# Patient Record
Sex: FEMALE | Race: WHITE | NOT HISPANIC OR LATINO | Employment: FULL TIME | URBAN - METROPOLITAN AREA
[De-identification: names, ages, dates, MRNs, and addresses within clinical notes are randomized per-mention and may not be internally consistent; named-entity substitution may affect disease eponyms.]

---

## 2016-09-28 LAB — HIV 1+2 AB+HIV1 P24 AG SERPL QL IA: NON REACTIVE

## 2017-02-18 ENCOUNTER — APPOINTMENT (OUTPATIENT)
Dept: ULTRASOUND IMAGING | Facility: CLINIC | Age: 37
End: 2017-02-18
Attending: OBSTETRICS & GYNECOLOGY
Payer: COMMERCIAL

## 2017-02-18 PROCEDURE — 76815 OB US LIMITED FETUS(S): CPT

## 2017-03-10 RX ORDER — CEFAZOLIN SODIUM 1 G/3ML
1 INJECTION, POWDER, FOR SOLUTION INTRAMUSCULAR; INTRAVENOUS
Status: CANCELLED | OUTPATIENT
Start: 2017-03-10

## 2017-03-10 RX ORDER — CEFAZOLIN SODIUM 2 G/100ML
2 INJECTION, SOLUTION INTRAVENOUS
Status: CANCELLED | OUTPATIENT
Start: 2017-03-10

## 2017-03-10 RX ORDER — SODIUM CHLORIDE, SODIUM LACTATE, POTASSIUM CHLORIDE, CALCIUM CHLORIDE 600; 310; 30; 20 MG/100ML; MG/100ML; MG/100ML; MG/100ML
INJECTION, SOLUTION INTRAVENOUS CONTINUOUS
Status: CANCELLED | OUTPATIENT
Start: 2017-03-10

## 2017-03-14 ENCOUNTER — HOSPITAL ENCOUNTER (OUTPATIENT)
Facility: CLINIC | Age: 37
Discharge: HOME | End: 2017-03-15
Attending: OBSTETRICS & GYNECOLOGY | Admitting: OBSTETRICS & GYNECOLOGY
Payer: COMMERCIAL

## 2017-03-14 VITALS
TEMPERATURE: 97.9 F | RESPIRATION RATE: 18 BRPM | DIASTOLIC BLOOD PRESSURE: 69 MMHG | WEIGHT: 158 LBS | BODY MASS INDEX: 26.33 KG/M2 | SYSTOLIC BLOOD PRESSURE: 118 MMHG | HEIGHT: 65 IN

## 2017-03-14 LAB
ALBUMIN UR-MCNC: NEGATIVE MG/DL
APPEARANCE UR: CLEAR
BILIRUB UR QL STRIP: NEGATIVE
COLOR UR AUTO: NORMAL
GLUCOSE UR STRIP-MCNC: NEGATIVE MG/DL
HGB UR QL STRIP: NEGATIVE
KETONES UR STRIP-MCNC: NEGATIVE MG/DL
LEUKOCYTE ESTERASE UR QL STRIP: NEGATIVE
NITRATE UR QL: NEGATIVE
PH UR STRIP: 6 PH (ref 5–7)
SP GR UR STRIP: 1.01 (ref 1–1.03)
URN SPEC COLLECT METH UR: NORMAL
UROBILINOGEN UR STRIP-MCNC: 0 MG/DL (ref 0–2)

## 2017-03-14 PROCEDURE — 81003 URINALYSIS AUTO W/O SCOPE: CPT | Performed by: OBSTETRICS & GYNECOLOGY

## 2017-03-14 RX ORDER — ONDANSETRON 2 MG/ML
4 INJECTION INTRAMUSCULAR; INTRAVENOUS EVERY 6 HOURS PRN
Status: DISCONTINUED | OUTPATIENT
Start: 2017-03-14 | End: 2017-03-15 | Stop reason: HOSPADM

## 2017-03-14 NOTE — IP AVS SNAPSHOT
[facility] Labor and Delivery    [address]    [facility] [address]    Phone:  [phone]    Fax:  [phone]                                       After Visit Summary   3/14/2017    Wesley Goins    MRN: [id number]           After Visit Summary Signature Page     I have received my discharge instructions, and my questions have been answered. I have discussed any challenges I see with this plan with the nurse or doctor.    ..........................................................................................................................................  Patient/Patient Representative Signature      ..........................................................................................................................................  Patient Representative Print Name and Relationship to Patient    ..................................................               ................................................  Date                                            Time    ..........................................................................................................................................  Reviewed by Signature/Title    ...................................................              ..............................................  Date                                                            Time

## 2017-03-14 NOTE — IP AVS SNAPSHOT
MRN:[id number]                      After Visit Summary   3/14/2017    Wesley Goins    MRN: [id number]           Thank you!     Thank you for choosing [facility] for your care. Our goal is always to provide you with excellent care. Hearing back from our patients is one way we can continue to improve our services. Please take a few minutes to complete the written survey that you may receive in the mail after you visit. If you would like to speak to someone directly about your visit please contact Patient Relations at [phone]. Thank you!          Patient Information     Date Of Birth          1980        About your hospital stay     You were admitted on:  March 14, 2017 You last received care in the:  [facility] Labor and Delivery    You were discharged on:  March 15, 2017       Who to Call     For medical emergencies, please call 911.  For non-urgent questions about your medical care, please call your primary care provider or clinic, [phone]          Attending Provider     Provider Specialty    Jose Lion MD OB/Gyn       Primary Care Provider Office Phone # Fax #    Shantal Ca [phone]       [facility] [address]  [facility] [address]        Your next 10 appointments already scheduled     Apr 19, 2017   Procedure with Shantal Ca MD   [facility] Labor and Delivery (--)    [address]  [facility] [address] [phone]              Further instructions from your care team       Discharge Instruction for Undelivered Patients      You were seen for: Labor Assessment  We Consulted: Dr Lion  You had (Test or Medicine):Fetal Heart Rate, Uterine monitoring and UA.     Diet:   Drink 8 to 12 glasses of liquids (milk, juice, water) every day.  You may eat meals and snacks.     Activity:  Count fetal kicks everyday (see handout)  Call your doctor or nurse midwife if your baby is moving less than usual.  "    Call your provider if you notice:  Swelling in your face or increased swelling in your hands or legs.  Headaches that are not relieved by Tylenol (acetaminophen).  Changes in your vision (blurring: seeing spots or stars.)  Nausea (sick to your stomach) and vomiting (throwing up).   Weight gain of 5 pounds or more per week.  Heartburn that doesn't go away.  Signs of bladder infection: pain when you urinate (use the toilet), need to go more often and more urgently.  The bag of santos (rupture of membranes) breaks, or you notice leaking in your underwear.  Bright red blood in your underwear.  Abdominal (lower belly) or stomach pain.  Second (plus) baby: Contractions (tightening) less than 10 minutes apart and getting stronger.  Increase or change in vaginal discharge (note the color and amount)    Follow-up:  As scheduled in the clinic          Pending Results     No orders found for last 3 day(s).            Admission Information     Date & Time Provider Department Dept. Phone    3/14/2017 Jose Lion MD [facility] Labor and Delivery [phone]      Your Vitals Were     Blood Pressure Temperature Respirations Height Weight Last Period    118/69 97.9  F (36.6  C) 18 1.651 m (5' 5\") 71.7 kg (158 lb) 2016    BMI (Body Mass Index)                   26.29 kg/m2           [employer] Information     [employer] lets you send messages to your doctor, view your test results, renew your prescriptions, schedule appointments and more. To sign up, go to [email or web address] . Click on \"Log in\" on the left side of the screen, which will take you to the Welcome page. Then click on \"Sign up Now\" on the right side of the page.     You will be asked to enter the access code listed below, as well as some personal information. Please follow the directions to create your username and password.     Your access code is: [email or web address]  Expires: 2017  5:20 PM     Your access code will  in 90 days. If you need help " or a new code, please call your [facility] clinic or [phone].        Care EveryWhere ID     This is your Care EveryWhere ID. This could be used by other organizations to access your [facility] medical records  [id number]           Review of your medicines      UNREVIEWED medicines. Ask your doctor about these medicines        Dose / Directions    NIFEDIPINE PO        Dose:  10 mg   Take 10 mg by mouth every 6 hours   Refills:  0       prenatal multivitamin  plus iron 27-0.8 MG Tabs per tablet        Dose:  1 tablet   Take 1 tablet by mouth daily   Refills:  0       VITAMIN D (CHOLECALCIFEROL) PO        Take by mouth daily   Refills:  0                Protect others around you: Learn how to safely use, store and throw away your medicines at www.disposemymeds.org.             Medication List: This is a list of all your medications and when to take them. Check marks below indicate your daily home schedule. Keep this list as a reference.      Medications           Morning Afternoon Evening Bedtime As Needed    NIFEDIPINE PO   Take 10 mg by mouth every 6 hours                                prenatal multivitamin  plus iron 27-0.8 MG Tabs per tablet   Take 1 tablet by mouth daily                                VITAMIN D (CHOLECALCIFEROL) PO   Take by mouth daily

## 2017-03-15 PROCEDURE — 99214 OFFICE O/P EST MOD 30 MIN: CPT | Mod: 25

## 2017-03-15 PROCEDURE — 99214 OFFICE O/P EST MOD 30 MIN: CPT

## 2017-03-15 NOTE — PLAN OF CARE
Hx of PTL, was here overnight last month for PTL ( 30 5/7) and d/c home on nifedipine 20 mg. Cervix at that time a FT and has remained unchanged at office visit last week. Nifedipine was decreased to 10mg every 6 hours since 31 wks. Worked 8 hr shift today, was on feet and active. Home at 1630 and UC;s increased at 1830, pt eating and drinking, pushed fluids and took Nifedipine at 2030 and continued to contract so came in. Feels baby moving, is more comfortable since arrival, monitors placed and UA sent

## 2017-03-15 NOTE — DISCHARGE INSTRUCTIONS
Discharge Instruction for Undelivered Patients      You were seen for: Labor Assessment  We Consulted: Dr Lion  You had (Test or Medicine):Fetal Heart Rate, Uterine monitoring and UA.     Diet:   Drink 8 to 12 glasses of liquids (milk, juice, water) every day.  You may eat meals and snacks.     Activity:  Count fetal kicks everyday (see handout)  Call your doctor or nurse midwife if your baby is moving less than usual.     Call your provider if you notice:  Swelling in your face or increased swelling in your hands or legs.  Headaches that are not relieved by Tylenol (acetaminophen).  Changes in your vision (blurring: seeing spots or stars.)  Nausea (sick to your stomach) and vomiting (throwing up).   Weight gain of 5 pounds or more per week.  Heartburn that doesn't go away.  Signs of bladder infection: pain when you urinate (use the toilet), need to go more often and more urgently.  The bag of santos (rupture of membranes) breaks, or you notice leaking in your underwear.  Bright red blood in your underwear.  Abdominal (lower belly) or stomach pain.  Second (plus) baby: Contractions (tightening) less than 10 minutes apart and getting stronger.  Increase or change in vaginal discharge (note the color and amount)    Follow-up:  As scheduled in the clinic

## 2017-03-15 NOTE — PLAN OF CARE
Discharge instructions completed.  Patient states she understands all discharge instructions and all her questions have been answered.  Verbalizes when she needs to return to clinic for follow up on Friday. Patient states has all belongings and D/C home.

## 2017-03-30 LAB — GROUP B STREP PCR: NEGATIVE

## 2017-04-16 NOTE — PHARMACY-ADMISSION MEDICATION HISTORY
Admission medication history interview status for this patient is complete. See [facility] admission navigator for allergy information, prior to admission medications and immunization status.     Medication history interview source(s):Patient  Medication history resources (including written lists, pill bottles, clinic record):-  Primary pharmacy:-  PTA meds completed by pre-admitting nurse Mony Finney and reviewed by pharmacy     Actions taken by pharmacist (provider contacted, etc):None     Additional medication history information:None    Medication reconciliation/reorder completed by provider prior to medication history? N/A    For patients on insulin therapy: No    Prior to Admission medications    Medication Sig Last Dose Taking? Auth Provider   VITAMIN D, CHOLECALCIFEROL, PO Take by mouth daily   Reported, Patient   Prenatal Vit-Fe Fumarate-FA (PRENATAL MULTIVITAMIN  PLUS IRON) 27-0.8 MG TABS Take 1 tablet by mouth daily   Reported, Patient

## 2017-04-17 ENCOUNTER — HOSPITAL ENCOUNTER (OUTPATIENT)
Dept: LAB | Facility: CLINIC | Age: 37
Discharge: HOME | End: 2017-04-17
Attending: OBSTETRICS & GYNECOLOGY | Admitting: OBSTETRICS & GYNECOLOGY
Payer: COMMERCIAL

## 2017-04-17 DIAGNOSIS — Z01.812 PRE-OPERATIVE LABORATORY EXAMINATION: ICD-10-CM

## 2017-04-17 LAB
ABO + RH BLD: ABNORMAL
ABO + RH BLD: ABNORMAL
BLD GP AB INVEST PLASRBC-IMP: ABNORMAL
BLD GP AB SCN SERPL QL: ABNORMAL
BLOOD BANK CMNT PATIENT-IMP: ABNORMAL
HGB BLD-MCNC: 13 G/DL (ref 11.7–15.7)
SPECIMEN EXP DATE BLD: ABNORMAL

## 2017-04-17 PROCEDURE — 36415 COLL VENOUS BLD VENIPUNCTURE: CPT | Performed by: OBSTETRICS & GYNECOLOGY

## 2017-04-17 PROCEDURE — 85018 HEMOGLOBIN: CPT | Performed by: OBSTETRICS & GYNECOLOGY

## 2017-04-17 PROCEDURE — 86780 TREPONEMA PALLIDUM: CPT | Performed by: OBSTETRICS & GYNECOLOGY

## 2017-04-17 PROCEDURE — 86901 BLOOD TYPING SEROLOGIC RH(D): CPT | Performed by: OBSTETRICS & GYNECOLOGY

## 2017-04-17 PROCEDURE — 86900 BLOOD TYPING SEROLOGIC ABO: CPT | Performed by: OBSTETRICS & GYNECOLOGY

## 2017-04-17 PROCEDURE — 86850 RBC ANTIBODY SCREEN: CPT | Performed by: OBSTETRICS & GYNECOLOGY

## 2017-04-17 PROCEDURE — 86870 RBC ANTIBODY IDENTIFICATION: CPT | Performed by: OBSTETRICS & GYNECOLOGY

## 2017-04-18 LAB — T PALLIDUM IGG+IGM SER QL: NEGATIVE

## 2017-04-19 ENCOUNTER — ANESTHESIA EVENT (OUTPATIENT)
Dept: OBGYN | Facility: CLINIC | Age: 37
End: 2017-04-19
Payer: COMMERCIAL

## 2017-04-19 ENCOUNTER — HOSPITAL ENCOUNTER (INPATIENT)
Facility: CLINIC | Age: 37
LOS: 3 days | Discharge: HOME | End: 2017-04-22
Attending: OBSTETRICS & GYNECOLOGY | Admitting: OBSTETRICS & GYNECOLOGY
Payer: COMMERCIAL

## 2017-04-19 ENCOUNTER — ANESTHESIA (OUTPATIENT)
Dept: OBGYN | Facility: CLINIC | Age: 37
End: 2017-04-19
Payer: COMMERCIAL

## 2017-04-19 DIAGNOSIS — Z98.891 S/P REPEAT LOW TRANSVERSE C-SECTION: Primary | ICD-10-CM

## 2017-04-19 LAB — BLOOD BANK CMNT PATIENT-IMP: NORMAL

## 2017-04-19 PROCEDURE — 25000125 ZZHC RX 250: Performed by: OBSTETRICS & GYNECOLOGY

## 2017-04-19 PROCEDURE — 12000029 ZZH R&B OB INTERMEDIATE

## 2017-04-19 PROCEDURE — 37000008 ZZH ANESTHESIA TECHNICAL FEE, 1ST 30 MIN: Performed by: OBSTETRICS & GYNECOLOGY

## 2017-04-19 PROCEDURE — 27210794 ZZH OR GENERAL SUPPLY STERILE: Performed by: OBSTETRICS & GYNECOLOGY

## 2017-04-19 PROCEDURE — 37000009 ZZH ANESTHESIA TECHNICAL FEE, EACH ADDTL 15 MIN: Performed by: OBSTETRICS & GYNECOLOGY

## 2017-04-19 PROCEDURE — 88302 TISSUE EXAM BY PATHOLOGIST: CPT | Mod: 26 | Performed by: OBSTETRICS & GYNECOLOGY

## 2017-04-19 PROCEDURE — 25000128 H RX IP 250 OP 636: Performed by: NURSE ANESTHETIST, CERTIFIED REGISTERED

## 2017-04-19 PROCEDURE — 25800025 ZZH RX 258: Performed by: OBSTETRICS & GYNECOLOGY

## 2017-04-19 PROCEDURE — 25800025 ZZH RX 258: Performed by: NURSE ANESTHETIST, CERTIFIED REGISTERED

## 2017-04-19 PROCEDURE — 25000125 ZZHC RX 250: Performed by: ANESTHESIOLOGY

## 2017-04-19 PROCEDURE — 0UB70ZZ EXCISION OF BILATERAL FALLOPIAN TUBES, OPEN APPROACH: ICD-10-PCS | Performed by: OBSTETRICS & GYNECOLOGY

## 2017-04-19 PROCEDURE — 25000128 H RX IP 250 OP 636: Performed by: ANESTHESIOLOGY

## 2017-04-19 PROCEDURE — 36000058 ZZH SURGERY LEVEL 3 EA 15 ADDTL MIN: Performed by: OBSTETRICS & GYNECOLOGY

## 2017-04-19 PROCEDURE — 25000128 H RX IP 250 OP 636: Performed by: OBSTETRICS & GYNECOLOGY

## 2017-04-19 PROCEDURE — 71000013 ZZH RECOVERY PHASE 1 LEVEL 1 EA ADDTL HR: Performed by: OBSTETRICS & GYNECOLOGY

## 2017-04-19 PROCEDURE — 27210995 ZZH RX 272: Performed by: OBSTETRICS & GYNECOLOGY

## 2017-04-19 PROCEDURE — 25000132 ZZH RX MED GY IP 250 OP 250 PS 637: Performed by: OBSTETRICS & GYNECOLOGY

## 2017-04-19 PROCEDURE — 88302 TISSUE EXAM BY PATHOLOGIST: CPT | Performed by: OBSTETRICS & GYNECOLOGY

## 2017-04-19 PROCEDURE — 25000128 H RX IP 250 OP 636

## 2017-04-19 PROCEDURE — 40000083 ZZH STATISTIC IP LACTATION SERVICES 1-15 MIN

## 2017-04-19 PROCEDURE — 36000056 ZZH SURGERY LEVEL 3 1ST 30 MIN: Performed by: OBSTETRICS & GYNECOLOGY

## 2017-04-19 PROCEDURE — 25000125 ZZHC RX 250: Performed by: NURSE ANESTHETIST, CERTIFIED REGISTERED

## 2017-04-19 PROCEDURE — 71000012 ZZH RECOVERY PHASE 1 LEVEL 1 FIRST HR: Performed by: OBSTETRICS & GYNECOLOGY

## 2017-04-19 RX ORDER — LANOLIN 100 %
OINTMENT (GRAM) TOPICAL
Status: DISCONTINUED | OUTPATIENT
Start: 2017-04-19 | End: 2017-04-22 | Stop reason: HOSPADM

## 2017-04-19 RX ORDER — HYDROMORPHONE HYDROCHLORIDE 1 MG/ML
.3-.5 INJECTION, SOLUTION INTRAMUSCULAR; INTRAVENOUS; SUBCUTANEOUS EVERY 30 MIN PRN
Status: DISCONTINUED | OUTPATIENT
Start: 2017-04-19 | End: 2017-04-22 | Stop reason: HOSPADM

## 2017-04-19 RX ORDER — NALOXONE HYDROCHLORIDE 0.4 MG/ML
.1-.4 INJECTION, SOLUTION INTRAMUSCULAR; INTRAVENOUS; SUBCUTANEOUS
Status: DISCONTINUED | OUTPATIENT
Start: 2017-04-19 | End: 2017-04-22 | Stop reason: HOSPADM

## 2017-04-19 RX ORDER — ACETAMINOPHEN 325 MG/1
650 TABLET ORAL EVERY 4 HOURS PRN
Status: DISCONTINUED | OUTPATIENT
Start: 2017-04-22 | End: 2017-04-22 | Stop reason: HOSPADM

## 2017-04-19 RX ORDER — ONDANSETRON 4 MG/1
4 TABLET, ORALLY DISINTEGRATING ORAL EVERY 30 MIN PRN
Status: DISCONTINUED | OUTPATIENT
Start: 2017-04-19 | End: 2017-04-19 | Stop reason: HOSPADM

## 2017-04-19 RX ORDER — OXYTOCIN/0.9 % SODIUM CHLORIDE 30/500 ML
100 PLASTIC BAG, INJECTION (ML) INTRAVENOUS CONTINUOUS
Status: DISCONTINUED | OUTPATIENT
Start: 2017-04-19 | End: 2017-04-22 | Stop reason: HOSPADM

## 2017-04-19 RX ORDER — BUPIVACAINE HYDROCHLORIDE 7.5 MG/ML
INJECTION, SOLUTION INTRASPINAL PRN
Status: DISCONTINUED | OUTPATIENT
Start: 2017-04-19 | End: 2017-04-19

## 2017-04-19 RX ORDER — HYDROCORTISONE 2.5 %
CREAM (GRAM) TOPICAL 3 TIMES DAILY PRN
Status: DISCONTINUED | OUTPATIENT
Start: 2017-04-19 | End: 2017-04-22 | Stop reason: HOSPADM

## 2017-04-19 RX ORDER — SODIUM CHLORIDE, SODIUM LACTATE, POTASSIUM CHLORIDE, CALCIUM CHLORIDE 600; 310; 30; 20 MG/100ML; MG/100ML; MG/100ML; MG/100ML
INJECTION, SOLUTION INTRAVENOUS CONTINUOUS
Status: DISCONTINUED | OUTPATIENT
Start: 2017-04-19 | End: 2017-04-19

## 2017-04-19 RX ORDER — BISACODYL 10 MG
10 SUPPOSITORY, RECTAL RECTAL DAILY PRN
Status: DISCONTINUED | OUTPATIENT
Start: 2017-04-21 | End: 2017-04-22 | Stop reason: HOSPADM

## 2017-04-19 RX ORDER — OXYTOCIN 10 [USP'U]/ML
10 INJECTION, SOLUTION INTRAMUSCULAR; INTRAVENOUS
Status: DISCONTINUED | OUTPATIENT
Start: 2017-04-19 | End: 2017-04-22 | Stop reason: HOSPADM

## 2017-04-19 RX ORDER — OXYTOCIN/0.9 % SODIUM CHLORIDE 30/500 ML
340 PLASTIC BAG, INJECTION (ML) INTRAVENOUS CONTINUOUS PRN
Status: DISCONTINUED | OUTPATIENT
Start: 2017-04-19 | End: 2017-04-22 | Stop reason: HOSPADM

## 2017-04-19 RX ORDER — DEXTROSE, SODIUM CHLORIDE, SODIUM LACTATE, POTASSIUM CHLORIDE, AND CALCIUM CHLORIDE 5; .6; .31; .03; .02 G/100ML; G/100ML; G/100ML; G/100ML; G/100ML
INJECTION, SOLUTION INTRAVENOUS CONTINUOUS
Status: DISCONTINUED | OUTPATIENT
Start: 2017-04-19 | End: 2017-04-22 | Stop reason: HOSPADM

## 2017-04-19 RX ORDER — CEFAZOLIN SODIUM 1 G/3ML
1 INJECTION, POWDER, FOR SOLUTION INTRAMUSCULAR; INTRAVENOUS
Status: DISCONTINUED | OUTPATIENT
Start: 2017-04-19 | End: 2017-04-19

## 2017-04-19 RX ORDER — SIMETHICONE 80 MG
80 TABLET,CHEWABLE ORAL 4 TIMES DAILY PRN
Status: DISCONTINUED | OUTPATIENT
Start: 2017-04-19 | End: 2017-04-22 | Stop reason: HOSPADM

## 2017-04-19 RX ORDER — ONDANSETRON 2 MG/ML
INJECTION INTRAMUSCULAR; INTRAVENOUS PRN
Status: DISCONTINUED | OUTPATIENT
Start: 2017-04-19 | End: 2017-04-19

## 2017-04-19 RX ORDER — IBUPROFEN 400 MG/1
400-800 TABLET, FILM COATED ORAL EVERY 6 HOURS PRN
Status: DISCONTINUED | OUTPATIENT
Start: 2017-04-19 | End: 2017-04-22 | Stop reason: HOSPADM

## 2017-04-19 RX ORDER — MISOPROSTOL 200 UG/1
400 TABLET ORAL
Status: DISCONTINUED | OUTPATIENT
Start: 2017-04-19 | End: 2017-04-22 | Stop reason: HOSPADM

## 2017-04-19 RX ORDER — ONDANSETRON 2 MG/ML
4 INJECTION INTRAMUSCULAR; INTRAVENOUS EVERY 30 MIN PRN
Status: DISCONTINUED | OUTPATIENT
Start: 2017-04-19 | End: 2017-04-19 | Stop reason: HOSPADM

## 2017-04-19 RX ORDER — NALBUPHINE HYDROCHLORIDE 10 MG/ML
5 INJECTION, SOLUTION INTRAMUSCULAR; INTRAVENOUS; SUBCUTANEOUS
Status: DISCONTINUED | OUTPATIENT
Start: 2017-04-19 | End: 2017-04-22 | Stop reason: HOSPADM

## 2017-04-19 RX ORDER — MAGNESIUM HYDROXIDE 1200 MG/15ML
LIQUID ORAL PRN
Status: DISCONTINUED | OUTPATIENT
Start: 2017-04-19 | End: 2017-04-19

## 2017-04-19 RX ORDER — ACETAMINOPHEN 325 MG/1
975 TABLET ORAL EVERY 8 HOURS
Status: COMPLETED | OUTPATIENT
Start: 2017-04-19 | End: 2017-04-22

## 2017-04-19 RX ORDER — CEFAZOLIN SODIUM 2 G/100ML
2 INJECTION, SOLUTION INTRAVENOUS
Status: COMPLETED | OUTPATIENT
Start: 2017-04-19 | End: 2017-04-19

## 2017-04-19 RX ORDER — NALBUPHINE HYDROCHLORIDE 10 MG/ML
INJECTION, SOLUTION INTRAMUSCULAR; INTRAVENOUS; SUBCUTANEOUS
Status: COMPLETED
Start: 2017-04-19 | End: 2017-04-19

## 2017-04-19 RX ORDER — FENTANYL CITRATE 50 UG/ML
25-50 INJECTION, SOLUTION INTRAMUSCULAR; INTRAVENOUS
Status: DISCONTINUED | OUTPATIENT
Start: 2017-04-19 | End: 2017-04-19 | Stop reason: HOSPADM

## 2017-04-19 RX ORDER — SODIUM CHLORIDE, SODIUM LACTATE, POTASSIUM CHLORIDE, CALCIUM CHLORIDE 600; 310; 30; 20 MG/100ML; MG/100ML; MG/100ML; MG/100ML
INJECTION, SOLUTION INTRAVENOUS CONTINUOUS PRN
Status: DISCONTINUED | OUTPATIENT
Start: 2017-04-19 | End: 2017-04-19

## 2017-04-19 RX ORDER — LIDOCAINE 40 MG/G
CREAM TOPICAL
Status: DISCONTINUED | OUTPATIENT
Start: 2017-04-19 | End: 2017-04-22 | Stop reason: HOSPADM

## 2017-04-19 RX ORDER — DIPHENHYDRAMINE HYDROCHLORIDE 50 MG/ML
25 INJECTION INTRAMUSCULAR; INTRAVENOUS EVERY 6 HOURS PRN
Status: DISCONTINUED | OUTPATIENT
Start: 2017-04-19 | End: 2017-04-22 | Stop reason: HOSPADM

## 2017-04-19 RX ORDER — OXYTOCIN/0.9 % SODIUM CHLORIDE 30/500 ML
PLASTIC BAG, INJECTION (ML) INTRAVENOUS PRN
Status: DISCONTINUED | OUTPATIENT
Start: 2017-04-19 | End: 2017-04-19

## 2017-04-19 RX ORDER — MORPHINE SULFATE 1 MG/ML
INJECTION, SOLUTION EPIDURAL; INTRATHECAL; INTRAVENOUS PRN
Status: DISCONTINUED | OUTPATIENT
Start: 2017-04-19 | End: 2017-04-19

## 2017-04-19 RX ORDER — DIPHENHYDRAMINE HCL 25 MG
25 CAPSULE ORAL EVERY 6 HOURS PRN
Status: DISCONTINUED | OUTPATIENT
Start: 2017-04-19 | End: 2017-04-22 | Stop reason: HOSPADM

## 2017-04-19 RX ORDER — SODIUM CHLORIDE, SODIUM LACTATE, POTASSIUM CHLORIDE, CALCIUM CHLORIDE 600; 310; 30; 20 MG/100ML; MG/100ML; MG/100ML; MG/100ML
INJECTION, SOLUTION INTRAVENOUS CONTINUOUS
Status: DISCONTINUED | OUTPATIENT
Start: 2017-04-19 | End: 2017-04-19 | Stop reason: HOSPADM

## 2017-04-19 RX ORDER — IBUPROFEN 400 MG/1
400-800 TABLET, FILM COATED ORAL EVERY 6 HOURS
Status: COMPLETED | OUTPATIENT
Start: 2017-04-19 | End: 2017-04-20

## 2017-04-19 RX ORDER — OXYCODONE HYDROCHLORIDE 5 MG/1
5-10 TABLET ORAL
Status: DISCONTINUED | OUTPATIENT
Start: 2017-04-19 | End: 2017-04-22 | Stop reason: HOSPADM

## 2017-04-19 RX ORDER — KETOROLAC TROMETHAMINE 30 MG/ML
30 INJECTION, SOLUTION INTRAMUSCULAR; INTRAVENOUS EVERY 6 HOURS
Status: DISCONTINUED | OUTPATIENT
Start: 2017-04-19 | End: 2017-04-19

## 2017-04-19 RX ORDER — ONDANSETRON 2 MG/ML
4 INJECTION INTRAMUSCULAR; INTRAVENOUS EVERY 6 HOURS PRN
Status: DISCONTINUED | OUTPATIENT
Start: 2017-04-19 | End: 2017-04-22 | Stop reason: HOSPADM

## 2017-04-19 RX ORDER — EPHEDRINE SULFATE 50 MG/ML
INJECTION, SOLUTION INTRAVENOUS PRN
Status: DISCONTINUED | OUTPATIENT
Start: 2017-04-19 | End: 2017-04-19

## 2017-04-19 RX ORDER — AMOXICILLIN 250 MG
1-2 CAPSULE ORAL 2 TIMES DAILY
Status: DISCONTINUED | OUTPATIENT
Start: 2017-04-19 | End: 2017-04-22 | Stop reason: HOSPADM

## 2017-04-19 RX ADMIN — MORPHINE SULFATE 0.3 MG: 1 INJECTION EPIDURAL; INTRATHECAL; INTRAVENOUS at 06:07

## 2017-04-19 RX ADMIN — OXYTOCIN-SODIUM CHLORIDE 0.9% IV SOLN 30 UNIT/500ML 100 ML/HR: 30-0.9/5 SOLUTION at 11:36

## 2017-04-19 RX ADMIN — NALBUPHINE HYDROCHLORIDE 5 MG: 10 INJECTION, SOLUTION INTRAMUSCULAR; INTRAVENOUS; SUBCUTANEOUS at 09:35

## 2017-04-19 RX ADMIN — SODIUM CHLORIDE, SODIUM LACTATE, POTASSIUM CHLORIDE, CALCIUM CHLORIDE AND DEXTROSE MONOHYDRATE: 5; 600; 310; 30; 20 INJECTION, SOLUTION INTRAVENOUS at 17:10

## 2017-04-19 RX ADMIN — NALBUPHINE HYDROCHLORIDE 5 MG: 10 INJECTION, SOLUTION INTRAMUSCULAR; INTRAVENOUS; SUBCUTANEOUS at 16:17

## 2017-04-19 RX ADMIN — ONDANSETRON 4 MG: 2 INJECTION INTRAMUSCULAR; INTRAVENOUS at 06:25

## 2017-04-19 RX ADMIN — SENNOSIDES AND DOCUSATE SODIUM 1 TABLET: 8.6; 5 TABLET ORAL at 20:49

## 2017-04-19 RX ADMIN — NALBUPHINE HYDROCHLORIDE 5 MG: 10 INJECTION, SOLUTION INTRAMUSCULAR; INTRAVENOUS; SUBCUTANEOUS at 07:39

## 2017-04-19 RX ADMIN — SODIUM CHLORIDE, POTASSIUM CHLORIDE, SODIUM LACTATE AND CALCIUM CHLORIDE 1000 ML: 600; 310; 30; 20 INJECTION, SOLUTION INTRAVENOUS at 05:58

## 2017-04-19 RX ADMIN — PHENYLEPHRINE HYDROCHLORIDE 100 MCG: 10 INJECTION, SOLUTION INTRAMUSCULAR; INTRAVENOUS; SUBCUTANEOUS at 06:16

## 2017-04-19 RX ADMIN — PHENYLEPHRINE HYDROCHLORIDE 100 MCG: 10 INJECTION, SOLUTION INTRAMUSCULAR; INTRAVENOUS; SUBCUTANEOUS at 06:25

## 2017-04-19 RX ADMIN — KETOROLAC TROMETHAMINE 30 MG: 30 INJECTION, SOLUTION INTRAMUSCULAR at 15:15

## 2017-04-19 RX ADMIN — KETOROLAC TROMETHAMINE 30 MG: 30 INJECTION, SOLUTION INTRAMUSCULAR at 09:16

## 2017-04-19 RX ADMIN — SENNOSIDES AND DOCUSATE SODIUM 1 TABLET: 8.6; 5 TABLET ORAL at 11:35

## 2017-04-19 RX ADMIN — ACETAMINOPHEN 975 MG: 325 TABLET, FILM COATED ORAL at 17:10

## 2017-04-19 RX ADMIN — SODIUM CITRATE AND CITRIC ACID MONOHYDRATE 30 ML: 500; 334 SOLUTION ORAL at 05:58

## 2017-04-19 RX ADMIN — SODIUM CHLORIDE, POTASSIUM CHLORIDE, SODIUM LACTATE AND CALCIUM CHLORIDE: 600; 310; 30; 20 INJECTION, SOLUTION INTRAVENOUS at 06:03

## 2017-04-19 RX ADMIN — ACETAMINOPHEN 975 MG: 325 TABLET, FILM COATED ORAL at 09:13

## 2017-04-19 RX ADMIN — BUPIVACAINE HYDROCHLORIDE IN DEXTROSE 15 MG: 7.5 INJECTION, SOLUTION SUBARACHNOID at 06:07

## 2017-04-19 RX ADMIN — PHENYLEPHRINE HYDROCHLORIDE 100 MCG: 10 INJECTION, SOLUTION INTRAMUSCULAR; INTRAVENOUS; SUBCUTANEOUS at 06:09

## 2017-04-19 RX ADMIN — OXYTOCIN-SODIUM CHLORIDE 0.9% IV SOLN 30 UNIT/500ML 30 ML: 30-0.9/5 SOLUTION at 06:24

## 2017-04-19 RX ADMIN — SODIUM CHLORIDE, POTASSIUM CHLORIDE, SODIUM LACTATE AND CALCIUM CHLORIDE: 600; 310; 30; 20 INJECTION, SOLUTION INTRAVENOUS at 06:42

## 2017-04-19 RX ADMIN — OXYCODONE HYDROCHLORIDE 5 MG: 5 TABLET ORAL at 20:49

## 2017-04-19 RX ADMIN — EPHEDRINE SULFATE 10 MG: 50 INJECTION INTRAMUSCULAR; INTRAVENOUS; SUBCUTANEOUS at 06:09

## 2017-04-19 RX ADMIN — CEFAZOLIN SODIUM 2 G: 2 INJECTION, SOLUTION INTRAVENOUS at 06:03

## 2017-04-19 RX ADMIN — IBUPROFEN 800 MG: 400 TABLET ORAL at 20:49

## 2017-04-19 NOTE — ANESTHESIA PREPROCEDURE EVALUATION
PAC NOTE:       ANESTHESIA PRE EVALUATION:  Anesthesia Evaluation     . Pt has had prior anesthetic. Type: General    No history of anesthetic complications          ROS/MED HX    ENT/Pulmonary:  - neg pulmonary ROS     Neurologic:  - neg neurologic ROS     Cardiovascular:  - neg cardiovascular ROS       METS/Exercise Tolerance:     Hematologic: Comments: Lab Test        04/17/17     02/17/17     03/28/15      --          11/29/14                       0956          1448          0601           --           1735          WBC           --           --           --           --          13.3*         HGB          13.0         11.8         10.3*          < >        10.7*         MCV           --           --           --           --          90            PLT           --           --           --           --          221           INR           --           --           --           --          0.91           < > = values in this interval not displayed.                  Lab Test        11/29/14                       1735          NA           137           POTASSIUM    3.6           CHLORIDE     106           CO2          27            BUN          13            CR           0.75          ANIONGAP     4             DAVID          8.3*          GLC          78                    Musculoskeletal:  - neg musculoskeletal ROS       GI/Hepatic:  - neg GI/hepatic ROS       Renal/Genitourinary:  - ROS Renal section negative       Endo:  - neg endo ROS       Psychiatric:  - neg psychiatric ROS       Infectious Disease:  - neg infectious disease ROS       Malignancy:         Other:    - neg other ROS                 Physical Exam  Normal systems: cardiovascular, pulmonary and dental    Airway   Mallampati: II  TM distance: >3 FB  Neck ROM: full    Dental     Cardiovascular       Pulmonary              Anesthesia Plan      History & Physical Review  History and physical reviewed and following examination; no interval  change.    ASA Status:  2 .    NPO Status:  > 8 hours    Plan for Spinal   PONV prophylaxis:  Ondansetron (or other 5HT-3)       Postoperative Care  Postoperative pain management:  IV analgesics, Oral pain medications and Neuraxial analgesia.      Consents  Anesthetic plan, risks, benefits and alternatives discussed with:  Patient.  Use of blood products discussed: Yes.   Use of blood products discussed with Patient.  Consented to blood products.  .                            .

## 2017-04-19 NOTE — PLAN OF CARE
Patient [facility] to [facility] for scheduled C/S. VS stable. Positive fetal movement. EFM monitors explained and placed x's 2. Baseline , moderate variability with accelerations present. PIV placed. Procedure explained to patient by Dr Ca. Questions answered. Consents signed. Patient prepped for surgery.

## 2017-04-19 NOTE — IP AVS SNAPSHOT
[facility]    [address] [facility] [address]    Phone:  [phone]    Fax:  [phone]                                       After Visit Summary   4/19/2017    Wesley Goins    MRN: [id number]           After Visit Summary Signature Page     I have received my discharge instructions, and my questions have been answered. I have discussed any challenges I see with this plan with the nurse or doctor.    ..........................................................................................................................................  Patient/Patient Representative Signature      ..........................................................................................................................................  Patient Representative Print Name and Relationship to Patient    ..................................................               ................................................  Date                                            Time    ..........................................................................................................................................  Reviewed by Signature/Title    ...................................................              ..............................................  Date                                                            Time

## 2017-04-19 NOTE — ANESTHESIA CARE TRANSFER NOTE
Patient: Wesley Goins    Procedure(s):  REPEAT  SECTION, SALPINGECTOMY BILATERAL  - Wound Class: II-Clean Contaminated    Diagnosis: Previous  , Elective sterilization   Diagnosis Additional Information: No value filed.    Anesthesia Type:   Spinal     Note:  Airway :Room Air  Patient transferred to:Labor and Delivery  Comments: To L&D, Awake, VSS, SV,, Report to RN      Vitals: (Last set prior to Anesthesia Care Transfer)    CRNA VITALS  2017 0620 - 2017 0657      2017             Pulse: 75    SpO2: 98 %                Electronically Signed By: Dean Dennis Severson, APRN CRNA  2017  6:57 AM

## 2017-04-19 NOTE — IP AVS SNAPSHOT
MRN:[id number]                      After Visit Summary   4/19/2017    Wesley Goins    MRN: [id number]           Thank you!     Thank you for choosing [facility] for your care. Our goal is always to provide you with excellent care. Hearing back from our patients is one way we can continue to improve our services. Please take a few minutes to complete the written survey that you may receive in the mail after you visit. If you would like to speak to someone directly about your visit please contact Patient Relations at [phone]. Thank you!          Patient Information     Date Of Birth          1980        Designated Caregiver       Most Recent Value    Caregiver    Will someone help with your care after discharge? no      About your hospital stay     You were admitted on:  April 19, 2017 You last received care in the:  [facility] Postpartum    You were discharged on:  April 22, 2017       Who to Call     For medical emergencies, please call 911.  For non-urgent questions about your medical care, please call your primary care provider or clinic, [phone]  For questions related to your surgery, please call your surgery clinic        Attending Provider     Provider Specialty    Susanna Walton MD OB/Gyn    Shantal Ca MD OB/Gyn       Primary Care Provider Office Phone # Fax #    Shantal Ca [phone]       [facility] [address] [address]        After Care Instructions     Activity       Review discharge instructions            Diet       Resume previous diet            Discharge Instructions - Gestational diabetic patients       Gestational diabetic patients to follow up for fasting blood sugar and 2 hour 75gm glucose load at 6 weeks postpartum.            Discharge Instructions - Postpartum visit       Schedule postpartum visit with your provider and return to clinic in 6 weeks.                  Further instructions  from your care team       Postop  Birth Instructions    Lactation [phone]    Activity       Do not lift more than 10 pounds for 6 weeks after surgery.  Ask family and friends for help when you need it.    No driving until you have stopped taking your pain medications (usually two weeks after surgery).    No heavy exercise or activity for 6 weeks.  Don't do anything that will put a strain on your surgery site.    Don't strain when using the toilet.  Your care team may prescribe a stool softener if you have problems with your bowel movements.     To care for your incision:       Keep the incision clean and dry.    Do not soak your incision in water. No swimming or hot tubs until it has fully healed. You may soak in the bathtub if the water level is below your incision.    Do not use peroxide, gel, cream, lotion, or ointment on your incision.    Adjust your clothes to avoid pressure on your surgery site (check the elastic in your underwear for example).     You may see a small amount of clear or pink drainage and this is normal.  Check with your health care provider:       If the drainage increases or has an odor.    If the incision reddens, you have swelling, or develop a rash.    If you have increased pain and the medicine we prescribed doesn't help.    If you have a fever above 100.4 F (38 C) with or without chills when placing thermometer under your tongue.   The area around your incision (surgery wound), will feel numb.  This is normal. The numbness should go away in less than a year.     Keep your hands clean:  Always wash your hands before touching your incision (surgery wound). This helps reduce your risk of infection. If your hands aren't dirty, you may use an alcohol hand-rub to clean your hands. Keep your nails clean and short.    Call your healthcare provider if you have any of these symptoms:       You soak a sanitary pad with blood within 1 hour, or you see blood clots larger than a golf  "ball.    Bleeding that lasts more than 6 weeks.    Vaginal discharge that smells bad.    Severe pain, cramping or tenderness in your lower belly area.    A need to urinate more frequently (use the toilet more often), more urgently (use the toilet very quickly), or it burns when you urinate.    Nausea and vomiting.    Redness, swelling or pain around a vein in your leg.    Problems breastfeeding or a red or painful area on your breast.    Chest pain and cough or are gasping for air.    Problems with coping with sadness, anxiety or depression. If you have concerns about hurting yourself or the baby, call your provider immediately.      You have questions or concerns after you return home.                  Pending Results     No orders found from 2017 to 2017.            Admission Information     Date & Time Provider Department Dept. Phone    2017 Shantal Ca MD [facility] Postpartum [phone]      Your Vitals Were     Blood Pressure Pulse Temperature Respirations Height Weight    114/69 68 98.1  F (36.7  C) (Oral) 18 1.651 m (5' 5\") 72.6 kg (160 lb)    Last Period Pulse Oximetry BMI (Body Mass Index)             2016 98% 26.63 kg/m2         MyChart Information     [employer] lets you send messages to your doctor, view your test results, renew your prescriptions, schedule appointments and more. To sign up, go to [email or web address] . Click on \"Log in\" on the left side of the screen, which will take you to the Welcome page. Then click on \"Sign up Now\" on the right side of the page.     You will be asked to enter the access code listed below, as well as some personal information. Please follow the directions to create your username and password.     Your access code is: [email or web address]  Expires: 2017  5:20 PM     Your access code will  in 90 days. If you need help or a new code, please call your [facility] or [phone].        Care EveryWhere ID     This is your Care " EveryWhere ID. This could be used by other organizations to access your [facility] medical records  [id number]           Review of your medicines      START taking        Dose / Directions    ibuprofen 400 MG tablet   Commonly known as:  ADVIL/MOTRIN        Dose:  400-800 mg   Take 1-2 tablets (400-800 mg) by mouth every 6 hours as needed for other (cramping)   Quantity:  120 tablet   Refills:  0       oxyCODONE 5 MG IR tablet   Commonly known as:  ROXICODONE        Dose:  5-10 mg   Take 1-2 tablets (5-10 mg) by mouth every 3 hours as needed for moderate to severe pain   Quantity:  30 tablet   Refills:  0       senna-docusate 8.6-50 MG per tablet   Commonly known as:  SENOKOT-S;PERICOLACE        Dose:  1-2 tablet   Take 1-2 tablets by mouth 2 times daily   Quantity:  100 tablet   Refills:  0         CONTINUE these medicines which have NOT CHANGED        Dose / Directions    prenatal multivitamin  plus iron 27-0.8 MG Tabs per tablet        Dose:  1 tablet   Take 1 tablet by mouth daily   Refills:  0       VITAMIN D (CHOLECALCIFEROL) PO        Take by mouth daily   Refills:  0            Where to get your medicines      These medications were sent to [facility] [address] [place] [address]     Phone:  [phone]     ibuprofen 400 MG tablet    senna-docusate 8.6-50 MG per tablet         Some of these will need a paper prescription and others can be bought over the counter. Ask your nurse if you have questions.     Bring a paper prescription for each of these medications     oxyCODONE 5 MG IR tablet                Protect others around you: Learn how to safely use, store and throw away your medicines at www.disposemymeds.org.             Medication List: This is a list of all your medications and when to take them. Check marks below indicate your daily home schedule. Keep this list as a reference.      Medications           Morning Afternoon  Evening Bedtime As Needed    ibuprofen 400 MG tablet   Commonly known as:  ADVIL/MOTRIN   Take 1-2 tablets (400-800 mg) by mouth every 6 hours as needed for other (cramping)   Last time this was given:  800 mg on 4/22/2017  5:58 AM                                oxyCODONE 5 MG IR tablet   Commonly known as:  ROXICODONE   Take 1-2 tablets (5-10 mg) by mouth every 3 hours as needed for moderate to severe pain   Last time this was given:  5 mg on 4/22/2017  9:14 AM                                prenatal multivitamin  plus iron 27-0.8 MG Tabs per tablet   Take 1 tablet by mouth daily                                senna-docusate 8.6-50 MG per tablet   Commonly known as:  SENOKOT-S;PERICOLACE   Take 1-2 tablets by mouth 2 times daily   Last time this was given:  2 tablets on 4/22/2017  9:14 AM                                VITAMIN D (CHOLECALCIFEROL) PO   Take by mouth daily

## 2017-04-19 NOTE — H&P
DATE OF ADMISSION:  2017.      PREOPERATIVE DIAGNOSES:   1.  Prior  section, desires repeat.   2.  Desiring elective sterilization.        POSTOPERATIVE DIAGNOSES:   1.  Prior  section, desires repeat.   2.  Desiring elective sterilization.        PROCEDURES:     1.  Repeat low transverse  section.   2.  Bilateral salpingectomy.   3.  Scar revision.   4.  Plication of rectus muscles.        SURGEON:  Shantal Ca MD      ASSISTANT:  None.      ANESTHESIA:  Spinal.      ESTIMATED BLOOD LOSS:  400 cc.      SPECIMENS:  Bilateral tubes and cord blood.      FINDINGS:  A male infant in the DEJAH presentation.      INDICATIONS:  Wesley Goins is a 36-year-old G5, P2-0-2-2 at 39-0/7 weeks, who was brought into Labor and Delivery for repeat low transverse  section and bilateral salpingectomies.  We discussed the risks, benefits and alternatives in the clinic and again the morning of the procedure including but not limited to perioperative risks, blood loss, infection, damage to surrounding organs and possible failure of her tubal ligation.  She did sign informed consent.      The patient was brought to the operating room where spinal anesthetic was placed and was prepped and draped in normal sterile fashion.  The spinal was found to be adequate and a pause for the cause was performed and patient and procedure were correctly identified.  At this point, an ellipse of the prior incision was created and this was carried down to the fascial layer with Bovie electrocautery, which was scored and stretched bilaterally.  The peritoneal cavity was then tented with 2 Luciana and entered sharply with the [place] scissors.  This was stretched bilaterally.  A bladder flap was created and dissected inferiorly.  The bladder blade was placed.  At this point, an area of the uterus was tented up with 2 Allis clamps.  A hysterotomy was begun and it was entered sharply and then bluntly with a finger and  stretched bilaterally.  The fetal vertex delivered in DEJAH presentation with no evidence of nuchal cord.  The remainder of the infant delivered atraumatically and was placed on the maternal abdomen for delayed cord clamp.  The cord was then clamped by myself and cut and the infant was handed over to the nursery staff.      At this point, the placenta delivered spontaneously, Schultze presentation, intact with 3-vessel cord.  The hysterotomy was clipped, the uterus was exteriorized, wrapped in a moist lap and wiped of all placental membrane fragments.  The hysterotomy was closed with a running locked suture of 0 Monocryl and a second horizontal imbricating suture of 0 Monocryl.  At this point, inspection of the tubes and ovaries did appear to be normal.  Using the LigaSure device, the bilateral fallopian tubes were excised.  The uterus was returned to the maternal abdomen.  Irrigated the pericolic gutters with moist laps.  The hysterotomy was reinspected and found to be hemostatic and all other tissue layers were found to be hemostatic.  At this point, the rectus muscles were replicated with 0 Vicryl suture, and the fascial incision was closed with 0 Vicryl and found to be without palpable defect.  The subcutaneous tissue was irrigated with a moist lap. Hemostasis achieved with Bovie electrocautery and the skin was closed with Insorb staples.  In the mid section of the incision, there was still some gaping between the skin edges and for that reason 4-0 Monocryl was used subcuticular to reapproximate the areas and was dressed with Insorb dressing.  The patient tolerated the procedure well.  All counts were correct and she will be transferred to PACU in stable condition.         CHARLIE GANT MD             D: 2017 06:56   T: 2017 07:51   MT:       Name:     ELIOT GOMEZ   MRN:      [id number]        Account:      [id number]   :      1980           Admitted:     [id number]       Document: [id number]

## 2017-04-19 NOTE — PLAN OF CARE
Data: Wesley Goins transferred to 454 via cart at 1000. Baby transferred via parent's arms.  Action: Receiving unit notified of transfer: Yes. Patient and family notified of room change. Report given to Radha Crenshaw RN at 1000. Belongings sent to receiving unit. Accompanied by Registered Nurse. Oriented patient to surroundings. Call light within reach. ID bands double-checked with receiving RN.  Response: Patient tolerated transfer and is stable.

## 2017-04-19 NOTE — PLAN OF CARE
Increased facial itching. Repeated Nubain 5 mg IV per Dr Reese's instructions. Fundus remains firm @ u/3 with small rubra.

## 2017-04-19 NOTE — OR NURSING
Returned to MAC # 2 following a scheduled repeat  and bilateral salpingectomy. Incision is covered with Tegaderm which is dry and intact. Fundus is firm @ u/3 with scant rubra. Denies any pain or nausea. Baby placed skin to skin and is breast feeding well.

## 2017-04-19 NOTE — BRIEF OP NOTE
[facility]  Brief Operative Note    Pre-operative diagnosis: Previous  , Elective sterilization    Post-operative diagnosis same   Procedure: Procedure(s):  COMBINED  SECTION, SALPINGECTOMY BILATERAL  Scar revision and plication of rectus muscles   Surgeon: Shantal Ca MD   Assistants(s): none   Anesthesia: Spinal    Estimated blood loss: 400 cc   Specimens: Bilateral tubes and cord blood   Findings: Male infant in DEJAH presentation.    Complications: None   Comments: See dictated operative report for full details       Shantal Ca

## 2017-04-19 NOTE — LACTATION NOTE
LC to see patient.  She is a new delivery and a multip.  She is comfortable with latch, positioning, and frequency of feeds.  She has a hx of breast augementation but experienced no issues with breastfeeding her last baby.  She is aware she may call lactation prn, but has no questions or concerns at this time.

## 2017-04-19 NOTE — ANESTHESIA PROCEDURE NOTES
Peripheral nerve/Neuraxial procedure note : intrathecal  Pre-Procedure  Performed by RACHEL OSEGUERA  Referred by STALIN  Location: OB, OR      Pre-Anesthestic Checklist: patient identified, IV checked, risks and benefits discussed, informed consent, monitors and equipment checked, pre-op evaluation and at physician/surgeon's request    Timeout  Correct Patient: Yes   Correct Procedure: Yes   Correct Site: Yes   Correct Laterality: N/A   Correct Position: Yes   Site Marked: N/A   .   Procedure Documentation    .    Procedure:    Intrathecal.  Insertion Site:L3-4  (midline approach)      Patient Prep;mask, sterile gloves, povidone-iodine 7.5% surgical scrub, patient draped.  .  Needle: (). # of attempts: 1. # of redirects:. Spinal Needle: Sprotte 24 G. 3.5 in.  Introducer used. . .     Assessment/Narrative  Paresthesias: No.  .  .  clear CSF fluid removed .

## 2017-04-19 NOTE — PLAN OF CARE
Problem: Goal Outcome Summary  Goal: Goal Outcome Summary  Outcome: Improving  Patient is exceeding expectations for this shift. She is managing pain with scheduled tylenol and toradol. She is up and ambulating to the bathroom to change her pad occasionally.  is at the bedside and supportive. She is independent with self and infant cares. Will continue to monitor.

## 2017-04-19 NOTE — PLAN OF CARE
Problem: Goal Outcome Summary  Goal: Goal Outcome Summary  Outcome: Improving  Recieved care and report of patient at 1000. Oriented to unit at that time, spouse and infant present. Patient has been up to bathroom, steady on feet.Patient to call for SBA to get up. Tolerated food and fluidsl Denies pain or nausea. Small amount of drainage under tegaderm on arrival an no changes. Anticipate D/C PPD 3.

## 2017-04-19 NOTE — ANESTHESIA POSTPROCEDURE EVALUATION
Patient: Wesley Goins    Procedure(s):  REPEAT  SECTION, SALPINGECTOMY BILATERAL  - Wound Class: II-Clean Contaminated    Diagnosis:Previous  , Elective sterilization   Diagnosis Additional Information: Previous  , Elective sterilization      Anesthesia Type:  Spinal    Note:  Anesthesia Post Evaluation    Patient location during evaluation: PACU  Patient participation: Able to fully participate in evaluation  Level of consciousness: awake  Pain management: adequate  Airway patency: patent  Cardiovascular status: acceptable  Respiratory status: acceptable  Hydration status: acceptable  PONV: controlled     Anesthetic complications: None    Comments: .Anticipate full return of neurologic function          Last vitals:  Vitals:    17 0835 17 0840 17 0845   BP:  101/73    Resp:      Temp:      SpO2: 98% 99% 99%         Electronically Signed By: Tj Reese DO  2017  9:24 AM

## 2017-04-20 LAB
ABO + RH BLD: NORMAL
ABO + RH BLD: NORMAL
BLOOD BANK CMNT PATIENT-IMP: NORMAL
COPATH REPORT: NORMAL
DATE RH IMM GL GVN: NORMAL
FETAL CELL SCN BLD QL ROSETTE: NORMAL
HGB BLD-MCNC: 12.1 G/DL (ref 11.7–15.7)
RH IG VIALS RECOM PATIENT: NORMAL

## 2017-04-20 PROCEDURE — 85018 HEMOGLOBIN: CPT | Performed by: OBSTETRICS & GYNECOLOGY

## 2017-04-20 PROCEDURE — 40000083 ZZH STATISTIC IP LACTATION SERVICES 1-15 MIN

## 2017-04-20 PROCEDURE — 86900 BLOOD TYPING SEROLOGIC ABO: CPT | Performed by: OBSTETRICS & GYNECOLOGY

## 2017-04-20 PROCEDURE — 25000128 H RX IP 250 OP 636: Performed by: ANESTHESIOLOGY

## 2017-04-20 PROCEDURE — 86901 BLOOD TYPING SEROLOGIC RH(D): CPT | Performed by: OBSTETRICS & GYNECOLOGY

## 2017-04-20 PROCEDURE — 12000029 ZZH R&B OB INTERMEDIATE

## 2017-04-20 PROCEDURE — 36415 COLL VENOUS BLD VENIPUNCTURE: CPT | Performed by: OBSTETRICS & GYNECOLOGY

## 2017-04-20 PROCEDURE — 12000027 ZZH R&B OB

## 2017-04-20 PROCEDURE — 25000132 ZZH RX MED GY IP 250 OP 250 PS 637: Performed by: OBSTETRICS & GYNECOLOGY

## 2017-04-20 PROCEDURE — 25000128 H RX IP 250 OP 636: Performed by: OBSTETRICS & GYNECOLOGY

## 2017-04-20 PROCEDURE — 85461 HEMOGLOBIN FETAL: CPT | Performed by: OBSTETRICS & GYNECOLOGY

## 2017-04-20 RX ADMIN — NALBUPHINE HYDROCHLORIDE 5 MG: 10 INJECTION, SOLUTION INTRAMUSCULAR; INTRAVENOUS; SUBCUTANEOUS at 00:00

## 2017-04-20 RX ADMIN — IBUPROFEN 800 MG: 400 TABLET ORAL at 21:55

## 2017-04-20 RX ADMIN — IBUPROFEN 800 MG: 400 TABLET ORAL at 03:08

## 2017-04-20 RX ADMIN — NALBUPHINE HYDROCHLORIDE 5 MG: 10 INJECTION, SOLUTION INTRAMUSCULAR; INTRAVENOUS; SUBCUTANEOUS at 18:49

## 2017-04-20 RX ADMIN — OXYCODONE HYDROCHLORIDE 5 MG: 5 TABLET ORAL at 21:55

## 2017-04-20 RX ADMIN — OXYCODONE HYDROCHLORIDE 5 MG: 5 TABLET ORAL at 15:13

## 2017-04-20 RX ADMIN — SENNOSIDES AND DOCUSATE SODIUM 2 TABLET: 8.6; 5 TABLET ORAL at 21:55

## 2017-04-20 RX ADMIN — NALBUPHINE HYDROCHLORIDE 5 MG: 10 INJECTION, SOLUTION INTRAMUSCULAR; INTRAVENOUS; SUBCUTANEOUS at 21:56

## 2017-04-20 RX ADMIN — NALBUPHINE HYDROCHLORIDE 5 MG: 10 INJECTION, SOLUTION INTRAMUSCULAR; INTRAVENOUS; SUBCUTANEOUS at 12:07

## 2017-04-20 RX ADMIN — ACETAMINOPHEN 975 MG: 325 TABLET, FILM COATED ORAL at 08:51

## 2017-04-20 RX ADMIN — ACETAMINOPHEN 975 MG: 325 TABLET, FILM COATED ORAL at 00:11

## 2017-04-20 RX ADMIN — SENNOSIDES AND DOCUSATE SODIUM 1 TABLET: 8.6; 5 TABLET ORAL at 11:56

## 2017-04-20 RX ADMIN — OXYCODONE HYDROCHLORIDE 5 MG: 5 TABLET ORAL at 09:36

## 2017-04-20 RX ADMIN — OXYCODONE HYDROCHLORIDE 5 MG: 5 TABLET ORAL at 18:56

## 2017-04-20 RX ADMIN — OXYCODONE HYDROCHLORIDE 5 MG: 5 TABLET ORAL at 12:07

## 2017-04-20 RX ADMIN — OXYCODONE HYDROCHLORIDE 5 MG: 5 TABLET ORAL at 06:12

## 2017-04-20 RX ADMIN — IBUPROFEN 800 MG: 400 TABLET ORAL at 15:13

## 2017-04-20 RX ADMIN — HUMAN RHO(D) IMMUNE GLOBULIN 300 MCG: 300 INJECTION, SOLUTION INTRAMUSCULAR at 14:00

## 2017-04-20 RX ADMIN — IBUPROFEN 800 MG: 400 TABLET ORAL at 08:50

## 2017-04-20 RX ADMIN — NALBUPHINE HYDROCHLORIDE 5 MG: 10 INJECTION, SOLUTION INTRAMUSCULAR; INTRAVENOUS; SUBCUTANEOUS at 06:12

## 2017-04-20 RX ADMIN — NALBUPHINE HYDROCHLORIDE 5 MG: 10 INJECTION, SOLUTION INTRAMUSCULAR; INTRAVENOUS; SUBCUTANEOUS at 15:13

## 2017-04-20 RX ADMIN — ACETAMINOPHEN 975 MG: 325 TABLET, FILM COATED ORAL at 17:23

## 2017-04-20 RX ADMIN — NALBUPHINE HYDROCHLORIDE 5 MG: 10 INJECTION, SOLUTION INTRAMUSCULAR; INTRAVENOUS; SUBCUTANEOUS at 03:08

## 2017-04-20 RX ADMIN — OXYCODONE HYDROCHLORIDE 5 MG: 5 TABLET ORAL at 00:15

## 2017-04-20 RX ADMIN — OXYCODONE HYDROCHLORIDE 5 MG: 5 TABLET ORAL at 03:08

## 2017-04-20 RX ADMIN — NALBUPHINE HYDROCHLORIDE 5 MG: 10 INJECTION, SOLUTION INTRAMUSCULAR; INTRAVENOUS; SUBCUTANEOUS at 09:12

## 2017-04-20 NOTE — PLAN OF CARE
Pt continues to have increased itching. Reports that it was only on her face and now is all over her body. Reports that the Nubain is really helping (see E-MAR)

## 2017-04-20 NOTE — PLAN OF CARE
Data: Vital signs within normal limits. Postpartum checks within normal limits - see flow record. Patient eating and drinking normally. Patient able to empty bladder independently and is up ambulating. No apparent signs of infection. Incision healing well. Patient performing self cares and is able to care for infant.  Action: Patient medicated during the shift for pain. See MAR. Patient reassessed within 1 hour after each medication and pain was improved - patient stated she was comfortable. Patient education done about pain medication. See flow record.  Response: Positive attachment behaviors observed with infant. Support persons Bubba, her  has been present all day and very supportive.   Plan: Anticipate discharge on 4/22/2017.

## 2017-04-20 NOTE — LACTATION NOTE
Lactation follow up.  Baby continues to Nw however he is being circ'd now and prepared parents for sleepy baby much of afternoon. If he won't awaken enc mom to try hand expressing EBM and spoon feed baby as needed. If she would like to us an electric pump enc mom to ask staff for help. Encouraged mom to call us PRN.

## 2017-04-20 NOTE — PROGRESS NOTES
Afebrile.  She is breastfeeding, incision not inspected but she has no complaints.  Good pain control, tolerating diet.  Routine post operative care.

## 2017-04-20 NOTE — PLAN OF CARE
Problem: Goal Outcome Summary  Goal: Goal Outcome Summary  Outcome: Improving  Data: Vital signs within normal limits. Postpartum checks within normal limits - see flow record. Patient eating and drinking normally. Patient able to empty bladder independently and is up ambulating. No apparent signs of infection. Incision healing well. Patient performing self cares and is able to care for infant.  Action: Patient medicated during the shift for pain. See MAR. Patient reassessed within 1 hour after each medication and pain was improved - patient stated she was comfortable. Patient education done. See flow record.  Response: Positive attachment behaviors observed with infant. Support person present.   Plan: Anticipate discharge.

## 2017-04-21 PROCEDURE — 25000132 ZZH RX MED GY IP 250 OP 250 PS 637: Performed by: OBSTETRICS & GYNECOLOGY

## 2017-04-21 PROCEDURE — 25000128 H RX IP 250 OP 636: Performed by: ANESTHESIOLOGY

## 2017-04-21 PROCEDURE — 12000027 ZZH R&B OB

## 2017-04-21 PROCEDURE — 40000083 ZZH STATISTIC IP LACTATION SERVICES 1-15 MIN

## 2017-04-21 RX ADMIN — IBUPROFEN 800 MG: 400 TABLET ORAL at 16:38

## 2017-04-21 RX ADMIN — OXYCODONE HYDROCHLORIDE 10 MG: 5 TABLET ORAL at 20:24

## 2017-04-21 RX ADMIN — OXYCODONE HYDROCHLORIDE 10 MG: 5 TABLET ORAL at 11:12

## 2017-04-21 RX ADMIN — SENNOSIDES AND DOCUSATE SODIUM 1 TABLET: 8.6; 5 TABLET ORAL at 20:24

## 2017-04-21 RX ADMIN — OXYCODONE HYDROCHLORIDE 10 MG: 5 TABLET ORAL at 14:29

## 2017-04-21 RX ADMIN — ACETAMINOPHEN 975 MG: 325 TABLET, FILM COATED ORAL at 09:52

## 2017-04-21 RX ADMIN — IBUPROFEN 800 MG: 400 TABLET ORAL at 23:39

## 2017-04-21 RX ADMIN — OXYCODONE HYDROCHLORIDE 10 MG: 5 TABLET ORAL at 23:50

## 2017-04-21 RX ADMIN — IBUPROFEN 800 MG: 400 TABLET ORAL at 07:21

## 2017-04-21 RX ADMIN — ACETAMINOPHEN 975 MG: 325 TABLET, FILM COATED ORAL at 01:03

## 2017-04-21 RX ADMIN — OXYCODONE HYDROCHLORIDE 10 MG: 5 TABLET ORAL at 07:21

## 2017-04-21 RX ADMIN — ACETAMINOPHEN 975 MG: 325 TABLET, FILM COATED ORAL at 16:38

## 2017-04-21 RX ADMIN — SENNOSIDES AND DOCUSATE SODIUM 1 TABLET: 8.6; 5 TABLET ORAL at 07:21

## 2017-04-21 RX ADMIN — OXYCODONE HYDROCHLORIDE 5 MG: 5 TABLET ORAL at 01:27

## 2017-04-21 RX ADMIN — NALBUPHINE HYDROCHLORIDE 5 MG: 10 INJECTION, SOLUTION INTRAMUSCULAR; INTRAVENOUS; SUBCUTANEOUS at 01:27

## 2017-04-21 NOTE — PLAN OF CARE
Problem: Goal Outcome Summary  Goal: Goal Outcome Summary  Outcome: Improving  Data: Vital signs within normal limits. Postpartum checks within normal limits - see flow record. Patient eating and drinking normally.  Incision is covered with tagaderm dressing with moist drainage. Patient performing self cares and is able to care for infant.  IV patent and saline locked, site has no redness or swelling.     Action: Patient medicated during the shift for pain. See MAR. Patient reassessed within 1 hour after each medication and patient stated she was comfortable. Patient education done about DVT prevention, pain management, and normal postpartum findings. See flow record.     Response: Positive attachment behaviors observed with infant. Father of baby present and supportive.     Plan: Anticipate continued discharge planning.

## 2017-04-21 NOTE — LACTATION NOTE
"Lactation follow up.  Mom reports she is having \"normal toughening soreness\" at the breasts. Baby was tongue tied and had the frenulum released yesterday. Educated in tongue exercises ac to help train the tongue to stay out better for less pain with nursing. She is using hydrogels and mother love cream. Breasts are filling and baby is content pc.   "

## 2017-04-21 NOTE — PLAN OF CARE
Problem: Goal Outcome Summary  Goal: Goal Outcome Summary  Outcome: Improving  VSS. Meeting expected outcomes. Independent with cares of self and infant. Pain managed using oral pain medications. Reports itching is been relieved with nubain. IV patent and saline locked.

## 2017-04-21 NOTE — PROGRESS NOTES
"April 21, 2017      SUBJECTIVE: No acute overnight events.  Pain adequately controlled.  +Lochia light.  Tolerating PO.  Flatus +, no BM yet.  Ambulating/urinating w/o difficulty.  Denies CP/palp/SOB/LH.    OBJECTIVE: /67  Pulse 55  Temp 97.6  F (36.4  C) (Oral)  Resp 16  Ht 1.651 m (5' 5\")  Wt 72.6 kg (160 lb)  LMP 07/20/2016  SpO2 98%  Breastfeeding? Unknown  BMI 26.63 kg/m2  Gen: NAD, A&O x3  Abd: soft.  Incision C/D/I, no active bleeding.  No erythema, induration, or discharge.  Tegaderm in place.  Mild edema mons inferior to incision but soft, non-tender and nonerythematous, and improved from yesterday.  Ext: minimal edema BL LEs, symmetric, no CT    Hemoglobin   Date Value Ref Range Status   04/20/2017 12.1 11.7 - 15.7 g/dL Final   04/17/2017 13.0 11.7 - 15.7 g/dL Final   ]    A/P: POD#2 s/p repeat LTCS + BS with scar revision.  Doing well.  Afebrile, VSS.  - ibuprofen/oxycodone/tylenol PRN pain  - Rh neg, s/p rhogam  - regular diet as tolerated  - breastfeeding  - encouraged ambulation  - routine post-op care  - plan for home tomorrow AM.        DONAVAN YANCEY MD    "

## 2017-04-21 NOTE — PLAN OF CARE
Problem: Goal Outcome Summary  Goal: Goal Outcome Summary  Outcome: No Change  Patient independent with self and baby cares. Voiding independently. Saline lock removed, denies further itching. Has been taking Motrin, oxycodone and scheduled Tylenol for pain with reported decrease to stable. Hydrogel for tender nipples and ice to incision. Monitor.

## 2017-04-21 NOTE — PLAN OF CARE
Problem: Goal Outcome Summary  Goal: Goal Outcome Summary  Outcome: Improving  Patient stable and meeting expected outcomes. Independent with cares and tolerating activity well. Fundal checks and bleeding WDL. Pain managed with medication, ice pack, rest and support person. Breastfeeding infant with no assistance from staff. Bonding with . Father of  at bedside and supportive. IV removed per previous shift; IV site has no redness or swelling. Patient c/o itching, but declines any medication. Will continue to monitor. Continue discharge planning.

## 2017-04-22 VITALS
HEART RATE: 68 BPM | SYSTOLIC BLOOD PRESSURE: 114 MMHG | WEIGHT: 160 LBS | HEIGHT: 65 IN | BODY MASS INDEX: 26.66 KG/M2 | DIASTOLIC BLOOD PRESSURE: 69 MMHG | TEMPERATURE: 98.1 F | RESPIRATION RATE: 18 BRPM | OXYGEN SATURATION: 98 %

## 2017-04-22 PROCEDURE — 25000132 ZZH RX MED GY IP 250 OP 250 PS 637: Performed by: OBSTETRICS & GYNECOLOGY

## 2017-04-22 RX ORDER — IBUPROFEN 400 MG/1
400-800 TABLET, FILM COATED ORAL EVERY 6 HOURS PRN
Qty: 120 TABLET | Refills: 0 | Status: SHIPPED | OUTPATIENT
Start: 2017-04-22 | End: 2020-11-03

## 2017-04-22 RX ORDER — OXYCODONE HYDROCHLORIDE 5 MG/1
5-10 TABLET ORAL
Qty: 30 TABLET | Refills: 0 | Status: SHIPPED | OUTPATIENT
Start: 2017-04-22 | End: 2020-11-03

## 2017-04-22 RX ORDER — AMOXICILLIN 250 MG
1-2 CAPSULE ORAL 2 TIMES DAILY
Qty: 100 TABLET | Refills: 0 | Status: SHIPPED | OUTPATIENT
Start: 2017-04-22 | End: 2020-11-03

## 2017-04-22 RX ADMIN — OXYCODONE HYDROCHLORIDE 5 MG: 5 TABLET ORAL at 09:14

## 2017-04-22 RX ADMIN — OXYCODONE HYDROCHLORIDE 5 MG: 5 TABLET ORAL at 03:54

## 2017-04-22 RX ADMIN — IBUPROFEN 800 MG: 400 TABLET ORAL at 05:58

## 2017-04-22 RX ADMIN — ACETAMINOPHEN 650 MG: 325 TABLET, FILM COATED ORAL at 09:14

## 2017-04-22 RX ADMIN — ACETAMINOPHEN 975 MG: 325 TABLET, FILM COATED ORAL at 00:29

## 2017-04-22 RX ADMIN — SENNOSIDES AND DOCUSATE SODIUM 2 TABLET: 8.6; 5 TABLET ORAL at 09:14

## 2017-04-22 NOTE — PLAN OF CARE
Problem: Goal Outcome Summary  Goal: Goal Outcome Summary  Outcome: Improving  Data: Vital signs within normal limits. Postpartum checks within normal limits - see flow record. Patient eating and drinking normally. Patient able to empty bladder independently and is up ambulating. No apparent signs of infection. Incision healing well. Breasts are engorged, causing pt discomfort. Patient performing self cares and is able to care for infant.  Action:  T-Pump was provided for breast comfort and pt reports decrease in discomfort. Patient medicated during the shift for pain. See MAR. Patient reassessed within 1 hour after each medication and pain was improved - patient stated she was comfortable. Patient education done. See flow record.  Response: Positive attachment behaviors observed with infant. Support person present.   Plan: Anticipate discharge.

## 2017-04-22 NOTE — PLAN OF CARE
Problem: Goal Outcome Summary  Goal: Goal Outcome Summary  Outcome: No Change  Discharged home with baby.

## 2017-04-22 NOTE — DISCHARGE INSTRUCTIONS
Postop  Birth Instructions    Lactation [phone]    Activity       Do not lift more than 10 pounds for 6 weeks after surgery.  Ask family and friends for help when you need it.    No driving until you have stopped taking your pain medications (usually two weeks after surgery).    No heavy exercise or activity for 6 weeks.  Don't do anything that will put a strain on your surgery site.    Don't strain when using the toilet.  Your care team may prescribe a stool softener if you have problems with your bowel movements.     To care for your incision:       Keep the incision clean and dry.    Do not soak your incision in water. No swimming or hot tubs until it has fully healed. You may soak in the bathtub if the water level is below your incision.    Do not use peroxide, gel, cream, lotion, or ointment on your incision.    Adjust your clothes to avoid pressure on your surgery site (check the elastic in your underwear for example).     You may see a small amount of clear or pink drainage and this is normal.  Check with your health care provider:       If the drainage increases or has an odor.    If the incision reddens, you have swelling, or develop a rash.    If you have increased pain and the medicine we prescribed doesn't help.    If you have a fever above 100.4 F (38 C) with or without chills when placing thermometer under your tongue.   The area around your incision (surgery wound), will feel numb.  This is normal. The numbness should go away in less than a year.     Keep your hands clean:  Always wash your hands before touching your incision (surgery wound). This helps reduce your risk of infection. If your hands aren't dirty, you may use an alcohol hand-rub to clean your hands. Keep your nails clean and short.    Call your healthcare provider if you have any of these symptoms:       You soak a sanitary pad with blood within 1 hour, or you see blood clots larger than a golf ball.    Bleeding that lasts  more than 6 weeks.    Vaginal discharge that smells bad.    Severe pain, cramping or tenderness in your lower belly area.    A need to urinate more frequently (use the toilet more often), more urgently (use the toilet very quickly), or it burns when you urinate.    Nausea and vomiting.    Redness, swelling or pain around a vein in your leg.    Problems breastfeeding or a red or painful area on your breast.    Chest pain and cough or are gasping for air.    Problems with coping with sadness, anxiety or depression. If you have concerns about hurting yourself or the baby, call your provider immediately.      You have questions or concerns after you return home.

## 2017-04-22 NOTE — PROGRESS NOTES
POD 3    Doing well    vss afebrile  HEENT nl  Abdomen soft and NT  Fundus firm and NT  Incision dry and intact  Extremities nl with +1 edema    Hgb=12.2    Assessment  POD 3 s/p repeat c/s and BS                        Doing very well    Plan d/c home           Precautions reviewed          RTC 6 weeks

## 2017-04-22 NOTE — PROGRESS NOTES
Pt's breasts are engorged. Ice pack and heat pack provided. Pt is alternating between the two with moderate relief. Plan: Lactation to see pt today

## 2017-04-22 NOTE — PLAN OF CARE
Pt discharging to home with  and infant. Pt knows when to follow up in clinic. Discharge medications given to pt. All questions answered at this time.

## 2017-04-22 NOTE — PLAN OF CARE
Problem: Goal Outcome Summary  Goal: Goal Outcome Summary  Outcome: Improving  Pt up and about in room, bonding well with baby, producing large amt of breastmilk and pumping after feeding baby. R breast sore, warm pack applied, oxy/motrin effective for pain control .

## 2017-04-24 NOTE — OP NOTE
DATE OF ADMISSION: 2017.         PREOPERATIVE DIAGNOSES:    1. Prior  section, desires repeat.    2. Desiring elective sterilization.         POSTOPERATIVE DIAGNOSES:    1. Prior  section, desires repeat.    2. Desiring elective sterilization.         PROCEDURES:    1. Repeat low transverse  section.    2. Bilateral salpingectomy.    3. Scar revision.    4. Plication of rectus muscles.         SURGEON: Shantal Ca MD         ASSISTANT: None.         ANESTHESIA: Spinal.         ESTIMATED BLOOD LOSS: 400 cc.         SPECIMENS: Bilateral tubes and cord blood.         FINDINGS: A male infant in the DEJAH presentation.         INDICATIONS: Wesley Goins is a 36-year-old G5, P2-0-2-2 at 39-0/7 weeks, who was brought into Labor and Delivery for repeat low transverse  section and bilateral salpingectomies. We discussed the risks, benefits and alternatives in the clinic and again the morning of the procedure including but not limited to perioperative risks, blood loss, infection, damage to surrounding organs and possible failure of her tubal ligation. She did sign informed consent.         The patient was brought to the operating room where spinal anesthetic was placed and was prepped and draped in normal sterile fashion. The spinal was found to be adequate and a pause for the cause was performed and patient and procedure were correctly identified. At this point, an ellipse of the prior incision was created and this was carried down to the fascial layer with Bovie electrocautery, which was scored and stretched bilaterally. The peritoneal cavity was then tented with 2 Luciana and entered sharply with the [place] scissors. This was stretched bilaterally. A bladder flap was created and dissected inferiorly. The bladder blade was placed. At this point, an area of the uterus was tented up with 2 Allis clamps. A hysterotomy was begun and it was entered sharply and then bluntly with a finger and  stretched bilaterally. The fetal vertex delivered in DEJAH presentation with no evidence of nuchal cord. The remainder of the infant delivered atraumatically and was placed on the maternal abdomen for delayed cord clamp. The cord was then clamped by myself and cut and the infant was handed over to the nursery staff.         At this point, the placenta delivered spontaneously, Schultze presentation, intact with 3-vessel cord. The hysterotomy was clipped, the uterus was exteriorized, wrapped in a moist lap and wiped of all placental membrane fragments. The hysterotomy was closed with a running locked suture of 0 Monocryl and a second horizontal imbricating suture of 0 Monocryl. At this point, inspection of the tubes and ovaries did appear to be normal. Using the LigaSure device, the bilateral fallopian tubes were excised. The uterus was returned to the maternal abdomen. Irrigated the pericolic gutters with moist laps. The hysterotomy was reinspected and found to be hemostatic and all other tissue layers were found to be hemostatic. At this point, the rectus muscles were replicated with 0 Vicryl suture, and the fascial incision was closed with 0 Vicryl and found to be without palpable defect. The subcutaneous tissue was irrigated with a moist lap. Hemostasis achieved with Bovie electrocautery and the skin was closed with Insorb staples. In the mid section of the incision, there was still some gaping between the skin edges and for that reason 4-0 Monocryl was used subcuticular to reapproximate the areas and was dressed with Insorb dressing. The patient tolerated the procedure well. All counts were correct and she will be transferred to PACU in stable condition.       Revised WT:  2017, grace GANT MD             D: 2017 06:56   T: 2017 07:51   MT:       Name:     ELIOT GOMEZ   MRN:      [id number]        Account:        [id number]   :      1980           Procedure  Date: 04/19/2017      Document: [id number]

## 2017-04-26 NOTE — DISCHARGE SUMMARY
DISCHARGE SUMMARY      DATE OF ADMISSION:  2017.      DATE OF DISCHARGE:  2017.      DIAGNOSES ON ADMISSION:   1.  Prior  section, desiring repeat.   2.  Desiring elective sterilization.        DIAGNOSES ON DISCHARGE:   1.  Prior  section, desiring repeat.   2.  Desiring elective sterilization.        HOSPITAL COURSE:  Eliot Gomez was admitted and underwent an uncomplicated repeat  section with bilateral salpingectomy, scar revision and plication of the rectus muscles.  By postoperative day #1, she was meeting routine milestones, voiding on her own and pain was well controlled.  By postoperative day #3, routine teaching was performed.  She was given narcotics at discharge and was to followup in 6 weeks postoperative.  She is to call the clinic with any other questions.         CHARLIE GANT MD             D: 2017 19:40   T: 2017 09:44   MT:       Name:     ELIOT GOMEZ   MRN:      [id number]        Account:        [id number]   :      1980           Admit Date:     [id number]                                  Discharge Date: 2017      Document: [id number]

## 2017-07-22 ENCOUNTER — HEALTH MAINTENANCE LETTER (OUTPATIENT)
Age: 37
End: 2017-07-22

## 2019-05-03 NOTE — PROVIDER NOTIFICATION
03/14/17 2253   Provider Notification   Provider Name/Title Lohman   Method of Notification In Department   Request Evaluate - Remote   Notification Reason Patient Arrived;Uterine Activity;Other (Comment)   Reviewed pt hx, current complaint of UC's that have decreased in intensity since arrival, strip reviewed. UA sent, continue to monitor x 1 hr and update. No cervical check at this time  
   03/15/17 0005   Provider Notification   Provider Name/Title Dr Lion   Method of Notification Phone   Request Evaluate - Remote   Notification Reason Status Update;Pain;Uterine Activity   Dr Lion updated on patients status, pain has decreased since here, pt would like to go home and work tomorrow, has an appointment on Friday,  with moderate variability and accels, no decels noted, christoph every 3-7 minutes. Orders to D/C pt home.  
no

## 2020-02-10 ENCOUNTER — HEALTH MAINTENANCE LETTER (OUTPATIENT)
Age: 40
End: 2020-02-10

## 2020-09-18 ENCOUNTER — TRANSFERRED RECORDS (OUTPATIENT)
Dept: HEALTH INFORMATION MANAGEMENT | Facility: CLINIC | Age: 40
End: 2020-09-18

## 2020-09-24 ENCOUNTER — TRANSFERRED RECORDS (OUTPATIENT)
Dept: HEALTH INFORMATION MANAGEMENT | Facility: CLINIC | Age: 40
End: 2020-09-24

## 2020-09-28 ENCOUNTER — TRANSCRIBE ORDERS (OUTPATIENT)
Dept: OTHER | Age: 40
End: 2020-09-28

## 2020-09-28 DIAGNOSIS — H90.5 SENSORINEURAL HEARING LOSS (SNHL) OF LEFT EAR: Primary | ICD-10-CM

## 2020-09-30 NOTE — TELEPHONE ENCOUNTER
FUTURE VISIT INFORMATION      FUTURE VISIT INFORMATION:    Date: 11.10.20    Time: 8:25 AM    Location: [facility]  REFERRAL INFORMATION:    Referring provider:  Dr. Nehemiah Sutton    Referring providers clinic:  [facility]    Reason for visit/diagnosis  Sensorineural hearing loss (SNHL) of left ear .. Hearing test done with Nehemiah Sutton .. Referred her as well .. per patient's     RECORDS REQUESTED FROM:       Clinic name Comments Records Status Imaging Status   [facility] 9/18/2020 note from Dr Nehemiah Sutton   9/18/2020 audiogram  Pending    req 10/1/2020 - sent to scan      [facility]   [facility] MRN: [id number] 9/24/2020 CT Temporal Bone Sent to scan 10/1/2020 req 10/1/2020 - PACS                             *9.30.20 Looked up [facility]hemiah Sutton [facility]- came up with [facility]. Opens at 9AM. Phone [phone]. Will call for fax number.    10/1/2020 8:50AM referral in chart states patient is referred by [facility], not [facility]. Sent a message to Referrals team to forward whatever recs/referral received from transcribed order on 9/28/2020. Sent a fax to [facility] for images- Amay   *sent a fax to [facility] for records, images from [facility] in PACS- Amay   *[facility] recs received and sent to scan - Amay

## 2020-10-06 ENCOUNTER — VIRTUAL VISIT (OUTPATIENT)
Dept: OTOLARYNGOLOGY | Facility: CLINIC | Age: 40
End: 2020-10-06
Attending: OTOLARYNGOLOGY
Payer: COMMERCIAL

## 2020-10-06 DIAGNOSIS — H83.8X3 SUPERIOR SEMICIRCULAR CANAL DEHISCENCE OF BOTH EARS: Primary | ICD-10-CM

## 2020-10-06 PROCEDURE — 99203 OFFICE O/P NEW LOW 30 MIN: CPT | Mod: 95 | Performed by: OTOLARYNGOLOGY

## 2020-10-06 RX ORDER — SERTRALINE HYDROCHLORIDE 100 MG/1
100 TABLET, FILM COATED ORAL EVERY MORNING
COMMUNITY

## 2020-10-06 RX ORDER — LEVOTHYROXINE SODIUM 25 UG/1
25 TABLET ORAL DAILY
COMMUNITY
End: 2020-12-08

## 2020-10-06 NOTE — LETTER
"10/6/2020       RE: Wesley Goins  [address]  [place] [address]     Dear Colleague,    Thank you for referring your patient, Wesley Goins, to the [facility] [place] at [facility]. Please see a copy of my visit note below.    Wesley Goins is a 40 year old female who is being evaluated via a billable video visit.      The patient has been notified of following:     \"This video visit will be conducted via a call between you and your physician/provider. We have found that certain health care needs can be provided without the need for an in-person physical exam.  This service lets us provide the care you need with a video conversation.  If a prescription is necessary we can send it directly to your pharmacy.  If lab work is needed we can place an order for that and you can then stop by our lab to have the test done at a later time.    Video visits are billed at different rates depending on your insurance coverage.  Please reach out to your insurance provider with any questions.    If during the course of the call the physician/provider feels a video visit is not appropriate, you will not be charged for this service.\"    Patient has given verbal consent for Video visit? Yes  How would you like to obtain your AVS? MyChart  If you are dropped from the video visit, the video invite should be resent to: Send to e-mail at: [email or web address]  Will anyone else be joining your video visit? No        Video-Visit Details    Type of service:  Video Visit    Video Start Time: 11:40 AM  Video End Time: 12:10 PM    Originating Location (pt. Location): Home    Distant Location (provider location):  [facility] [place]     Platform used for Video Visit: Kristopher Islas MD      I had the pleasure of meeting Ms. Wesley Goins today in consultation for newly diagnosed superior semicircular " canal dehiscence syndrome.    HPI:  Ms. Goins is an otherwise healthy 40 year old woman who recently developed sudden hearing loss and awareness of SSCD symptoms. The course of events is as follows:  She flew to [facility] on the 22nd and when she was walking on the beach, she tripped and fell, landing on her shoulder but did not necessarily strike her head.  The next morning she awakened with her ear totally blocked on the left side.  She tried flushing it, thinking the symptoms would resolved, and yet she continued to have significant pressure in the ear.  She tried allergy medications without improvement over 3 weeks. She saw Dr. Sutton and the evaluation showed left mixed hearing loss and imaging confirmed SCD, left more than right.     Current symptoms discussed today include:  - She constantly hears her pulse in the ear  - She fels like when she runs like the ocean is in her hear  - If she hears a loud sound, her eyes move and she cannot focus  - No visual blurring or other symptoms with coughing or sneezing  - Dysequilibrium - loses balance if she bends over quickly   - Hearing loss - she cannot hear well in her left ear. When operating (noisy environment) she feels like she cannot hear well, conversations/words are not clear.   - She has loud autophony to voice, chewing, swallowing, eye movements (swish sound), walking, running, all mainly in the left ear  - Pressure if she lifts things    Most symptoms were not present 6 weeks ago.  However, years ago she recalls that she could heaer her pulse in the left ear and she may have been aware of loud noises leading to eye movement for a while as well.  She reports that her biggest fear is that the right is a ticking timebomb, she is worried about losing her right ear hearing and not being able to work.     She does NOT have a migraine history.  There may be a family history of headaches (sinus headaches).  She has a constant dull ache today.  No vision loss at  rest.     Patient Supplied Answers to Review of Systems   10 point ROS otherwise negative    PMH:  Hypothyroidism (on synthroid)    PSH:  Past Surgical History:   Procedure Laterality Date      SECTION        SECTION N/A 3/27/2015    Procedure:  SECTION;  Surgeon: Shantal Ca MD;  Location: [facility]     COMBINED  SECTION, SALPINGECTOMY BILATERAL Bilateral 2017    Procedure: COMBINED  SECTION, SALPINGECTOMY BILATERAL;  REPEAT  SECTION, SALPINGECTOMY BILATERAL ;  Surgeon: Shantal Ca MD;  Location: [facility]     HC ENLARGE BREAST WITH IMPLANT       ORTHOPEDIC SURGERY      shoulder     SHOULDER SURGERY  2013    decomprasion dce     Current Outpatient Medications   Medication     levothyroxine (SYNTHROID/LEVOTHROID) 25 MCG tablet     sertraline (ZOLOFT) 100 MG tablet     Prenatal Vit-Fe Fumarate-FA (PRENATAL MULTIVITAMIN  PLUS IRON) 27-0.8 MG TABS     VITAMIN D, CHOLECALCIFEROL, PO     No current facility-administered medications for this visit.      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  She is  and has young children. No current tobacco use.     Family history:  Headaches, no others with SCDS symptoms    Physical Exam (limited via video).  Gen: alert, oriented, well groomed, no distress  Neuro: normal facial nerve function, normal voice quality    Audiogram:  Outside audiogram was reviewed.  Right ear has normal hearing.  Left has a moderately severe rising to mild mixed hearing loss with reduced WRS out of proportion to what is expected.  This will need to be verified and assessed for progression, and acoustic reflexes were not completed and are needed to evaluate the third window.    Temporal Bone CT, reviewed ()  Bilateral SSCD observed, with left side having the larger dehiscence of the anterior and mid dome.  No involvement of the superior petrosal sinus.  Tegmen thinning present bilaterally.  Tegmen is low hanging.    MRI,  IAC, reviewed (2020):  No evidence of retrocochlear lesion to explain sensorineural hearing loss or other symptoms.    Impression and Plan:  1.  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  Left sudden hearing loss, resulting in mixed loss.  This has been observed in cases of SCDS in which patients experience sensorineural hearing loss with head trauma, although it is uncommon.  We prefer to have a unifying diagnosis, but it is also possible that she suffered an idiopathic sudden sensorineural hearing loss in an ear with pre-existing SCDS.  Regardless, symptoms were unmasked by this event.  3. Right SCD, currently asymptomatic    We reviewed our understanding of SCDS and associated symptoms, as well as the range of management strategies including observation, hearing amplification (hearing aid), PE tube placement, and canal occlusion procedures via transmastoid vs. Middle fossa approaches.  We reviewed the pros and cons of each approach, and rationale for pursuing surgical management.  Surgery will not improve sensorineural hearing loss and carries the risk of additional hearing loss.     The risks and benefits of canal occlusion were discussed.  The risks include but are not limited to: Profound and irreversible hearing loss, persistent conductive hearing loss, dizziness and vestibular loss, CSF leak, incomplete improvement in symptoms or failure to achieve desired result, facial nerve injury (monitoring is performed as standard of care), infection.  Postoperative restrictions were discussed.      As a next step, we will complete a physiologic evaluation with:  1) complete audiometric testing  2) VEMP testing  We will then discuss results and intervention.      Ms. Goins indicated that she is inclined to pursue surgical intervention with a transmastoid approach.  Temporal bone anatomy determines feasibility.  We will discuss this further at a visit  to follow up on test results and will answer additional questions at that time as well.    Neli Islas MD  Otology & Neurotology  [facility]

## 2020-10-06 NOTE — PROGRESS NOTES
"Wesley Goins is a 40 year old female who is being evaluated via a billable video visit.      The patient has been notified of following:     \"This video visit will be conducted via a call between you and your physician/provider. We have found that certain health care needs can be provided without the need for an in-person physical exam.  This service lets us provide the care you need with a video conversation.  If a prescription is necessary we can send it directly to your pharmacy.  If lab work is needed we can place an order for that and you can then stop by our lab to have the test done at a later time.    Video visits are billed at different rates depending on your insurance coverage.  Please reach out to your insurance provider with any questions.    If during the course of the call the physician/provider feels a video visit is not appropriate, you will not be charged for this service.\"    Patient has given verbal consent for Video visit? Yes  How would you like to obtain your AVS? MyChart  If you are dropped from the video visit, the video invite should be resent to: Send to e-mail at: [email or web address]  Will anyone else be joining your video visit? No        Video-Visit Details    Type of service:  Video Visit    Video Start Time: 11:40 AM  Video End Time: 12:10 PM    Originating Location (pt. Location): Home    Distant Location (provider location):  [facility] [place]     Platform used for Video Visit: Kristopher Islas MD      I had the pleasure of meeting Ms. Wesley Goins today in consultation for newly diagnosed superior semicircular canal dehiscence syndrome.    HPI:  Ms. Goins is an otherwise healthy 40 year old woman who recently developed sudden hearing loss and awareness of SSCD symptoms. The course of events is as follows:  She flew to [facility] on the 22nd and when she was walking on the beach, she tripped and fell, landing on her shoulder " but did not necessarily strike her head.  The next morning she awakened with her ear totally blocked on the left side.  She tried flushing it, thinking the symptoms would resolved, and yet she continued to have significant pressure in the ear.  She tried allergy medications without improvement over 3 weeks. She saw Dr. Sutton and the evaluation showed left mixed hearing loss and imaging confirmed SCD, left more than right.     Current symptoms discussed today include:  - She constantly hears her pulse in the ear  - She fels like when she runs like the ocean is in her hear  - If she hears a loud sound, her eyes move and she cannot focus  - No visual blurring or other symptoms with coughing or sneezing  - Dysequilibrium - loses balance if she bends over quickly   - Hearing loss - she cannot hear well in her left ear. When operating (noisy environment) she feels like she cannot hear well, conversations/words are not clear.   - She has loud autophony to voice, chewing, swallowing, eye movements (swish sound), walking, running, all mainly in the left ear  - Pressure if she lifts things    Most symptoms were not present 6 weeks ago.  However, years ago she recalls that she could heaer her pulse in the left ear and she may have been aware of loud noises leading to eye movement for a while as well.  She reports that her biggest fear is that the right is a ticking timebomb, she is worried about losing her right ear hearing and not being able to work.     She does NOT have a migraine history.  There may be a family history of headaches (sinus headaches).  She has a constant dull ache today.  No vision loss at rest.     Patient Supplied Answers to Review of Systems   10 point ROS otherwise negative    PMH:  Hypothyroidism (on synthroid)    PSH:  Past Surgical History:   Procedure Laterality Date      SECTION        SECTION N/A 3/27/2015    Procedure:  SECTION;  Surgeon: Shantal Ca MD;  Location:  [facility]     COMBINED  SECTION, SALPINGECTOMY BILATERAL Bilateral 2017    Procedure: COMBINED  SECTION, SALPINGECTOMY BILATERAL;  REPEAT  SECTION, SALPINGECTOMY BILATERAL ;  Surgeon: Shantal Ca MD;  Location: [facility]     HC ENLARGE BREAST WITH IMPLANT       ORTHOPEDIC SURGERY      shoulder     SHOULDER SURGERY  2013    decomprasion dce     Current Outpatient Medications   Medication     levothyroxine (SYNTHROID/LEVOTHROID) 25 MCG tablet     sertraline (ZOLOFT) 100 MG tablet     Prenatal Vit-Fe Fumarate-FA (PRENATAL MULTIVITAMIN  PLUS IRON) 27-0.8 MG TABS     VITAMIN D, CHOLECALCIFEROL, PO     No current facility-administered medications for this visit.      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 No current tobacco use.     Family history:  Headaches, no others with SCDS symptoms    Physical Exam (limited via video).  Gen: alert, oriented, well groomed, no distress  Neuro: normal facial nerve function, normal voice quality    Audiogram:  Outside audiogram was reviewed.  Right ear has normal hearing.  Left has a moderately severe rising to mild mixed hearing loss with reduced WRS out of proportion to what is expected.  This will need to be verified and assessed for progression, and acoustic reflexes were not completed and are needed to evaluate the third window.    Temporal Bone CT, reviewed ()  Bilateral SSCD observed, with left side having the larger dehiscence of the anterior and mid dome.  No involvement of the superior petrosal sinus.  Tegmen thinning present bilaterally.  Tegmen is low hanging.    MRI, IAC, reviewed ():  No evidence of retrocochlear lesion to explain sensorineural hearing loss or other symptoms.    Impression and Plan:  1.  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  Left sudden hearing loss, resulting in mixed loss.  This has been  observed in cases of SCDS in which patients experience sensorineural hearing loss with head trauma, although it is uncommon.  We prefer to have a unifying diagnosis, but it is also possible that she suffered an idiopathic sudden sensorineural hearing loss in an ear with pre-existing SCDS.  Regardless, symptoms were unmasked by this event.  3. Right SCD, currently asymptomatic    We reviewed our understanding of SCDS and associated symptoms, as well as the range of management strategies including observation, hearing amplification (hearing aid), PE tube placement, and canal occlusion procedures via transmastoid vs. Middle fossa approaches.  We reviewed the pros and cons of each approach, and rationale for pursuing surgical management.  Surgery will not improve sensorineural hearing loss and carries the risk of additional hearing loss.     The risks and benefits of canal occlusion were discussed.  The risks include but are not limited to: Profound and irreversible hearing loss, persistent conductive hearing loss, dizziness and vestibular loss, CSF leak, incomplete improvement in symptoms or failure to achieve desired result, facial nerve injury (monitoring is performed as standard of care), infection.  Postoperative restrictions were discussed.      As a next step, we will complete a physiologic evaluation with:  1) complete audiometric testing  2) VEMP testing  We will then discuss results and intervention.      Ms. Goins indicated that she is inclined to pursue surgical intervention with a transmastoid approach.  Temporal bone anatomy determines feasibility.  We will discuss this further at a visit to follow up on test results and will answer additional questions at that time as well.    Nlei Islas MD  Otology & Neurotology  [facility]

## 2020-10-06 NOTE — PATIENT INSTRUCTIONS
1. You were seen in the ENT Clinic today by .  If you have any questions or concerns after your appointment, please call   - Option 1: ENT Clinic: [phone]  - Option 2: [facility] (' Nurse): [phone]    2.   Plan to return to clinic to a new hearing test and VEMP testing.     CARLOS Wiley  Care Coordinator  [facility] Otolaryngology  [phone]

## 2020-10-07 ENCOUNTER — OFFICE VISIT (OUTPATIENT)
Dept: AUDIOLOGY | Facility: CLINIC | Age: 40
End: 2020-10-07
Attending: OTOLARYNGOLOGY
Payer: COMMERCIAL

## 2020-10-07 DIAGNOSIS — H90.72 MIXED CONDUCTIVE AND SENSORINEURAL HEARING LOSS OF LEFT EAR WITH UNRESTRICTED HEARING OF RIGHT EAR: Primary | ICD-10-CM

## 2020-10-07 DIAGNOSIS — H83.8X3 SUPERIOR SEMICIRCULAR CANAL DEHISCENCE OF BOTH EARS: ICD-10-CM

## 2020-10-07 PROCEDURE — 92565 STENGER TEST PURE TONE: CPT | Performed by: AUDIOLOGIST

## 2020-10-07 PROCEDURE — 92557 COMPREHENSIVE HEARING TEST: CPT | Performed by: AUDIOLOGIST

## 2020-10-07 PROCEDURE — 92550 TYMPANOMETRY & REFLEX THRESH: CPT | Performed by: AUDIOLOGIST

## 2020-10-07 PROCEDURE — 92700 UNLISTED ORL SERVICE/PX: CPT | Performed by: AUDIOLOGIST

## 2020-10-07 NOTE — PROGRESS NOTES
AUDIOLOGY REPORT    SUBJECTIVE: Wesley Goins was seen in the Audiology Clinic at the [facility] on 10/7/2020 for a vestibular evoked myogenic potential (VEMP) evaluation, referred by Neli Islas M.D. The patient reports a sudden onset of left hearing loss 6 weeks ago after tripping and falling on her right shoulder and bumping the right side of her forehead. She denies losing consciousness, or concussion from the fall. Since then she reports experiencing left ear symptoms: Aural fullness, constant low pitched tinnitus, hearing her pulse, eyeblinks and eye movements, and autophony. She also reports daily low-grade headaches localized in the left side of her head and temporal bone. The headaches seem to be worse at night. She reports dizziness and the feeling that her eyes are moving with loud sounds and disequilibrium when lifting heavy things. She denies dizziness with blowing her nose, coughing, or sneezing. Prior to her fall 6 weeks ago, she notes occasional dizziness with head movements or when bending over and getting back up. The patient denies aural pain, drainage, or history of ear surgeries. Hearing test performed earlier today revealed normal hearing in the right ear and moderate mixed hearing loss rising to normal hearing in the left ear. CT scan of the temporal bones performed on 9/24/20 is indicative of bilateral superior semicircular canal dehiscence, more significant on the left.      OBJECTIVE:   Abuse Screening:  Do you feel unsafe at home or work/school? No  Do you feel threatened by someone? No  Does anyone try to keep you from having contact with others, or doing things outside of your home? No  Physical signs of abuse present? No    VEMP testing is performed using an auditory evoked potentials system. Surface electrodes are placed in several locations on the patient's head and neck in order to record muscle motoneuron activity in response to  loud auditory stimuli. This testing assesses very specific vestibular pathways in the inner ear and central nervous system. cVEMP testing is performed with electrodes placed on the patient's sternocleidomastoid muscle, and is used to assess the saccular organ, afferent inferior vestibular nerve and vestibular nuclei within the brainstem. oVEMP testing is performed with electrodes placed under the patient's eyes, and is used to assess the utricle and afferent superior vestibular nerve and nuclei within the brainstem.  Patients that may benefit from this procedure are those with complaints of vertigo and imbalance or those suspected of superior canal dehiscence. A total of 90 minutes was spent completing the VEMP testing today.    Tympanograms : performed as part of hearing evaluation prior to VEMP testing.     RIGHT: Normal eardrum mobility     LEFT: Normal eardrum mobility    cVEMP Thresholds via 500 Hz toneburst:    RIGHT: 85 dB nHL which  suggest normal saccular and inferior vestibular nerve function    LEFT: 65 dB nHL which  indicate abnormal saccular or inferior vestibular nerve function    AMPLITUDE ASYMMETRY: 53%; Abnormal (greater than 33% is considered abnormal)    oVEMP Thresholds via 500 Hz toneburst:     RIGHT: 80 dB nHL which suggests abnormal utricle or superior vestibular nerve function    LEFT: 65 dB nHL which suggests abnormal utricle or superior vestibular nerve function    AMPLITUDE ASYMMETRY: 84%; Abnormal (greater than 33% is considered abnormal)    ASSESSMENT: Today's results suggest an abnormal cVEMP on the left side, and an abnormal oVEMP bilaterally, with abnormal amplitude asymmetry (left sided responses being larger) for both oVEMP and cVEMP. These results indicate abnormal saccular or inferior vestibular nerve function and abnormal utricle or superior vestibular nerve function. These results are consistent with physiologically active superior semicircular canal dehiscence, more significant  on the left side.    PLAN: The patient will follow up with Neli Islas M.D. for medical management. Please call this clinic with questions regarding these results or recommendations.       Enriqueta Villagrna.  Licensed Audiologist  [place] # [id number]

## 2020-10-07 NOTE — PROGRESS NOTES
AUDIOLOGY REPORT    SUBJECTIVE:  Wesley Goins is a 40 year old female who was seen in the Audiology Clinic at the [facility] for audiologic evaluation, referred by Neli Islas M.D. The patient reports a sudden onset of left hearing loss 6 weeks ago after tripping and falling on her right shoulder and bumping the right side of her forehead. She denies losing consciousness, or concussion from the fall. Since then she reports experiencing left ear symptoms: Aural fullness, constant low pitched tinnitus, hearing her pulse, eyeblinks and eye movements, and autophony. She also reports daily low-grade headaches localized in the left side of her head and temporal bone. The headaches seem to be worse at night. She reports dizziness and the feeling that her eyes are moving with loud sounds and disequilibrium when lifting heavy things. She denies dizziness with blowing her nose, coughing, or sneezing. Prior to her fall 6 weeks ago, she notes occasional dizziness with head movements or when bending over and getting back up. The patient denies aural pain, drainage, or history of ear surgeries. Previous hearing test performed at [facility] on 9/18/20 revealed normal hearing in the right ear and moderate mixed hearing loss rising to normal hearing in the left ear. CT scan of the temporal bones performed on 9/24/20 is indicative of bilateral superior semicircular canal dehiscence, more significant on the left.    OBJECTIVE:  Abuse Screening:  Do you feel unsafe at home or work/school? No  Do you feel threatened by someone? No  Does anyone try to keep you from having contact with others, or doing things outside of your home? No  Physical signs of abuse present? No     Fall Risk Screen:  1. Have you fallen two or more times in the past year? No  2. Have you fallen and had an injury in the past year? No    Timed Up and Go Score (in seconds): not tested  Is patient a fall risk?  No  Referral initiated: No  Fall Risk Assessment Completed by Audiology    Otoscopic exam indicates ears are clear of cerumen bilaterally     Pure Tone Thresholds assessed using conventional audiometry with good  reliability from 250-8000 Hz bilaterally using insert earphones and circumaural headphones     RIGHT:  Normal hearing    LEFT:    Moderate mixed hearing loss rising to normal hearing    Guevara: Negative at multiple frequencies    Tympanogram:    RIGHT: normal eardrum mobility    LEFT:   normal eardrum mobility    Reflexes (reported by stimulus ear):  RIGHT: Ipsilateral is present at normal levels  RIGHT: Contralateral is present at normal levels  LEFT:   Ipsilateral is present at normal levels  LEFT:   Contralateral is present at normal levels      Speech Reception Threshold:    RIGHT: 20 dB HL    LEFT:   45 dB HL  Word Recognition Score:     RIGHT: 100% at 55 dB HL using NU-6 recorded word list.    LEFT:   76% at 85 dB HL using NU-6 recorded word list.      ASSESSMENT: Normal hearing in the right ear and moderate mixed hearing loss rising to normal hearing in the left ear. Asymmetrical word recognition scores noted. Today s results were discussed with the patient in detail.     PLAN:  Patient was counseled regarding hearing loss and impact on communication. Patient is a candidate for amplification in the left ear, pending any medical intervention. It is recommended that the patient follow up with Dr. Islas for medical management. Please call this clinic with questions regarding these results or recommendations.      Enriqueta Villagran.  Licensed Audiologist  [place] # [id number]

## 2020-10-12 ENCOUNTER — TELEPHONE (OUTPATIENT)
Dept: OTOLARYNGOLOGY | Facility: CLINIC | Age: 40
End: 2020-10-12

## 2020-10-12 NOTE — TELEPHONE ENCOUNTER
Records Requested  10/12/20 @ 12:50PM    Facility  [facility]   Ph. [phone]  Fax. [phone]   Outcome Sent a fax for most recent MRI Brain report and to push images to [place] PACS      10/14/2020 10:10AM sent a 2nd fax for images and report - Amay   10/15/2020 8:47AM called medical records, they are several days backlogged. They will put a stat on this and fax report and push images - Amay   *received 9/24/2020 MRI Brain report and sent to scan, Images in PACS, notified MD Naima salcido

## 2020-10-12 NOTE — TELEPHONE ENCOUNTER
[facility] Call Center    Phone Message    May a detailed message be left on voicemail: yes     Reason for Call: Requesting Results   Name/type of test: hearing test  Date of test: 10/7/20  Was test done at a location other than [facility] (Please fill in the location if not [facility])?: No      Action Taken: Message routed to:  [facility] ENT    Travel Screening: Not Applicable

## 2020-10-13 ENCOUNTER — PREP FOR PROCEDURE (OUTPATIENT)
Dept: OTOLARYNGOLOGY | Facility: CLINIC | Age: 40
End: 2020-10-13

## 2020-10-13 ENCOUNTER — TELEPHONE (OUTPATIENT)
Dept: OTOLARYNGOLOGY | Facility: CLINIC | Age: 40
End: 2020-10-13

## 2020-10-13 DIAGNOSIS — H83.8X2 SUPERIOR SEMICIRCULAR CANAL DEHISCENCE OF LEFT EAR: Primary | ICD-10-CM

## 2020-10-13 RX ORDER — DEXAMETHASONE SODIUM PHOSPHATE 4 MG/ML
10 INJECTION, SOLUTION INTRA-ARTICULAR; INTRALESIONAL; INTRAMUSCULAR; INTRAVENOUS; SOFT TISSUE ONCE
Status: CANCELLED | OUTPATIENT
Start: 2020-10-13 | End: 2020-10-13

## 2020-10-13 NOTE — TELEPHONE ENCOUNTER
Patient called in regarding scheduling surgery with Dr. Islas    Date of Surgery: 12/9/2020  Location of surgery: [place] [facility]  Pre-Op H&P: Patient will arrange   Post-Op Appt Date: 3 weeks   Imaging needed:  N/A   Discussed COVID-19 testing:  Yes     Pre-cert/Authorization completed:  No  Packet sent out: Will be mailed 10/14/20      Henna Glass  10/13/20 6:16 PM

## 2020-10-16 ENCOUNTER — TELEPHONE (OUTPATIENT)
Dept: OTOLARYNGOLOGY | Facility: CLINIC | Age: 40
End: 2020-10-16

## 2020-10-16 NOTE — TELEPHONE ENCOUNTER
[facility] Call Center    Phone Message    May a detailed message be left on voicemail: yes     Reason for Call: Other: Pt would like to discuss pre-surgery and post-surgery requirements, wants to know if she can drive home same day or needs to observe dietary restrictions etc. would like a call back from the nurse before surgery on 12/9. Thanks!     Action Taken: Message routed to:  [facility]    Travel Screening: Not Applicable

## 2020-10-19 ENCOUNTER — TELEPHONE (OUTPATIENT)
Dept: OTOLARYNGOLOGY | Facility: CLINIC | Age: 40
End: 2020-10-19

## 2020-10-21 NOTE — TELEPHONE ENCOUNTER
"\"Please call patient and let her know that she will be admitted the first day for overnight observation to make sure there is not a CSF leak. She will need to have a  the next day. If she has not received one yet, we will send her a packet in the mail with the rest of the instructions. Per Inez HARTMAN\"    Left detailed msg with information above.    Sarah Beck LPN    "

## 2020-10-25 DIAGNOSIS — Z11.59 ENCOUNTER FOR SCREENING FOR OTHER VIRAL DISEASES: Primary | ICD-10-CM

## 2020-11-03 ENCOUNTER — OFFICE VISIT (OUTPATIENT)
Dept: OTOLARYNGOLOGY | Facility: CLINIC | Age: 40
End: 2020-11-03
Payer: COMMERCIAL

## 2020-11-03 VITALS
HEART RATE: 70 BPM | BODY MASS INDEX: 23.16 KG/M2 | HEIGHT: 65 IN | WEIGHT: 139 LBS | SYSTOLIC BLOOD PRESSURE: 115 MMHG | DIASTOLIC BLOOD PRESSURE: 70 MMHG

## 2020-11-03 DIAGNOSIS — H83.8X3 SUPERIOR SEMICIRCULAR CANAL DEHISCENCE OF BOTH EARS: Primary | ICD-10-CM

## 2020-11-03 PROCEDURE — 99213 OFFICE O/P EST LOW 20 MIN: CPT | Performed by: OTOLARYNGOLOGY

## 2020-11-03 ASSESSMENT — MIFFLIN-ST. JEOR: SCORE: 1296.38

## 2020-11-03 NOTE — PROGRESS NOTES
"Chief Complaint   Patient presents with     RECHECK     Discuss surgery      /70   Pulse 70   Ht 1.651 m (5' 5\")   Wt 72.6 kg (160 lb)   BMI 26.63 kg/m      "

## 2020-11-03 NOTE — LETTER
"11/3/2020       RE: Wesley Goins  [address]  [place] [address]     Dear Colleague,    Thank you for referring your patient, Wesley Goins, to the [facility] [place] at [facility]. Please see a copy of my visit note below.    Chief Complaint   Patient presents with     RECHECK     Discuss surgery      /70   Pulse 70   Ht 1.651 m (5' 5\")   Wt 72.6 kg (160 lb)   BMI 26.63 kg/m        I had the pleasure of seeing Wesley Goins in follow up today to discuss management of left superior semicircular canal dehiscence syndrome, confirmed on imaging and by symptoms and audiometric evidence of conductive hearing loss.  She also suffered a left sudden hearing loss, resulting in mixed loss, which has been observed in cases of SCDS in which patients experience sensorineural hearing loss with head trauma. She also has imaging evidence of right SCD, currently asymptomatic    Symptoms are detailed in our first visit note from 10/6/2020 and we reviewed these today.  The symptoms are very bothersome to her in both occupational and home settings.      ROS: 10 point ROS otherwise negative    Physical Examination:  Pt is alert, oriented and answering questions appropriately. Facial nerve function is normal, symmetric.  Bilateral auricles and pinnae are within normal limits.  Ear canals are clear and tympanic membranes are intact with no evidence of effusion or retraction. No nystagmus noted.    Audiogram, reviewed:   WRS:   Left, 76%   Right 100%  Normal tympanograms  Intact ipsilateral and contralateral acoustic reflexes despite presence of conductive hearing loss      VEMP testing:  cVEMP Thresholds via 500 Hz toneburst:    RIGHT: 85 dB nHL which  suggest normal saccular and inferior vestibular nerve function    LEFT: 65 dB nHL which  indicate abnormal saccular or inferior vestibular nerve function    AMPLITUDE ASYMMETRY: 53%; " Abnormal (greater than 33% is considered abnormal)     oVEMP Thresholds via 500 Hz toneburst:     RIGHT: 80 dB nHL which suggests abnormal utricle or superior vestibular nerve function    LEFT: 65 dB nHL which suggests abnormal utricle or superior vestibular nerve function    AMPLITUDE ASYMMETRY: 84%; Abnormal (greater than 33% is considered abnormal)     VEMP ASSESSMENT: Today's results suggest an abnormal cVEMP on the left side, and an abnormal oVEMP bilaterally, with abnormal amplitude asymmetry (left sided responses being larger) for both oVEMP and cVEMP. These results indicate abnormal saccular or inferior vestibular nerve function and abnormal utricle or superior vestibular nerve function. These results are consistent with physiologically active superior semicircular canal dehiscence, more significant on the left side.       Impression and Plan:  1. Left SCDS - confirmed by symptoms, imaging, audiovestibular testing  2. Left sudden hearing loss, leading to reduced word recognition scores - anticipated to be permanent, with no expectation of improvement in sensorineural hearing loss with surgical intervention.  3. Right SCD, asymptomatic    The patient has determined that symptoms of SCDS are significantly impacting daily activities and quality of life. She strongly desires surgical intervention.  We reviewed the options of expectant management, amplification, and PE tube placement, the expected outcomes with each and pros and cons. She prefers surgical canal occlusion.  We reviewed her CT.  A transmastoid approach is reasonable to start to avoid craniotomy.  The dura hangs low and there is a possibility of CSF leak, and also the potential that the approach cannot be completed in this manner, which will then require a separate surgery for middle fossa craniotomy.  She indicated understanding of this and a desire to avoid craniotomy if possible, which I agree with.    The risks and benefits of canal occlusion  were discussed.  The risks include but are not limited to: Profound and irreversible hearing loss, persistent conductive hearing loss, dizziness and vestibular loss which can lead to long lasting symptoms, CSF leak, incomplete improvement in symptoms or failure to achieve desired result, facial nerve injury (monitoring is performed as standard of care), bleeding, and infection.  Surgery will not improve sensorineural hearing loss and carries the risk of additional hearing loss. There is an expected hospital stay of 1 night, longer if there are additional vestibular symptoms or other concerns. Postoperative restrictions were discussed.  She and her  asked insightful questions and indicated that they would like to proceed.    Neli Islas MD  Otology & Neurotology  [facility]

## 2020-11-03 NOTE — PATIENT INSTRUCTIONS
Surgery Teaching      1.You must have a physical exam (called  history and physical ) within 30 days of surgery. You can do this at the PAC clinic or your family clinic.     2.For same-day surgery, you must arrange for an adult to take you home from the Center. An adult must stay with you for the first 24 hours after surgery. You cannot drive for 24 hours.     3. Ask your doctor what medicines are safe before surgery.     4. Stop drinking alcohol at least 24 hours before surgery.     5. Stop or at least cut down on smoking 24 hours before surgery.    6.Take a bath or shower the night before and the morning of surgery (as told by your surgeon). Use an antiseptic soap. If your doctor does not give you special soap, buy Hibiclens or Katja-Stat at the drug store or ask the pharmacist to suggest a brand.  Do not put on lotion, powder, perfume, deodorant or make-up after bathing.    7. You can eat a normal meal the night before surgery. Do not eat any solid foods or drink any milk products for 8 hours before surgery.     8. You may drink clear liquids until 2 hours before surgery. Clear liquids include water, Gatorade, apple juice and liquids you can read through.    9. NO MOTRIN, IBUPROFEN, ASPIRIN, ALEVE, GARLIC SUPPLEMENTS or FISH OIL x 7 days prior to surgery ( to prevent excess bleeding and bruising at time of surgery)      10. Covid Testing needs to be completed 3 days before your surgery date.

## 2020-11-05 ENCOUNTER — TELEPHONE (OUTPATIENT)
Dept: OTOLARYNGOLOGY | Facility: CLINIC | Age: 40
End: 2020-11-05

## 2020-11-05 NOTE — TELEPHONE ENCOUNTER
Advised patient that as of right now, her surgery has not been affected because it is scheduled for the [place] OR. Explained that this could possibly change in the future, but we would let her know if that was the case. Patient expressed understanding.    Gosia Glass, EMT

## 2020-11-05 NOTE — PROGRESS NOTES
I had the pleasure of seeing Wesley Goins in follow up today to discuss management of left superior semicircular canal dehiscence syndrome, confirmed on imaging and by symptoms and audiometric evidence of conductive hearing loss.  She also suffered a left sudden hearing loss, resulting in mixed loss, which has been observed in cases of SCDS in which patients experience sensorineural hearing loss with head trauma. She also has imaging evidence of right SCD, currently asymptomatic    Symptoms are detailed in our first visit note from 10/6/2020 and we reviewed these today.  The symptoms are very bothersome to her in both occupational and home settings.      ROS: 10 point ROS otherwise negative    Physical Examination:  Pt is alert, oriented and answering questions appropriately. Facial nerve function is normal, symmetric.  Bilateral auricles and pinnae are within normal limits.  Ear canals are clear and tympanic membranes are intact with no evidence of effusion or retraction. No nystagmus noted.    Audiogram, reviewed:   WRS:   Left, 76%   Right 100%  Normal tympanograms  Intact ipsilateral and contralateral acoustic reflexes despite presence of conductive hearing loss      VEMP testing:  cVEMP Thresholds via 500 Hz toneburst:    RIGHT: 85 dB nHL which  suggest normal saccular and inferior vestibular nerve function    LEFT: 65 dB nHL which  indicate abnormal saccular or inferior vestibular nerve function    AMPLITUDE ASYMMETRY: 53%; Abnormal (greater than 33% is considered abnormal)     oVEMP Thresholds via 500 Hz toneburst:     RIGHT: 80 dB nHL which suggests abnormal utricle or superior vestibular nerve function    LEFT: 65 dB nHL which suggests abnormal utricle or superior vestibular nerve function    AMPLITUDE ASYMMETRY: 84%; Abnormal (greater than 33% is considered abnormal)     VEMP ASSESSMENT: Today's results suggest an abnormal cVEMP on the left side, and an abnormal oVEMP bilaterally, with abnormal  amplitude asymmetry (left sided responses being larger) for both oVEMP and cVEMP. These results indicate abnormal saccular or inferior vestibular nerve function and abnormal utricle or superior vestibular nerve function. These results are consistent with physiologically active superior semicircular canal dehiscence, more significant on the left side.       Impression and Plan:  1. Left SCDS - confirmed by symptoms, imaging, audiovestibular testing  2. Left sudden hearing loss, leading to reduced word recognition scores - anticipated to be permanent, with no expectation of improvement in sensorineural hearing loss with surgical intervention.  3. Right SCD, asymptomatic    The patient has determined that symptoms of SCDS are significantly impacting daily activities and quality of life. She strongly desires surgical intervention.  We reviewed the options of expectant management, amplification, and PE tube placement, the expected outcomes with each and pros and cons. She prefers surgical canal occlusion.  We reviewed her CT.  A transmastoid approach is reasonable to start to avoid craniotomy.  The dura hangs low and there is a possibility of CSF leak, and also the potential that the approach cannot be completed in this manner, which will then require a separate surgery for middle fossa craniotomy.  She indicated understanding of this and a desire to avoid craniotomy if possible, which I agree with.    The risks and benefits of canal occlusion were discussed.  The risks include but are not limited to: Profound and irreversible hearing loss, persistent conductive hearing loss, dizziness and vestibular loss which can lead to long lasting symptoms, CSF leak, incomplete improvement in symptoms or failure to achieve desired result, facial nerve injury (monitoring is performed as standard of care), bleeding, and infection.  Surgery will not improve sensorineural hearing loss and carries the risk of additional hearing loss.  There is an expected hospital stay of 1 night, longer if there are additional vestibular symptoms or other concerns. Postoperative restrictions were discussed.  She and her  asked insightful questions and indicated that they would like to proceed.    Neli Islas MD  Otology & Neurotology  [facility]

## 2020-11-05 NOTE — TELEPHONE ENCOUNTER
Health Call Center    Phone Message    May a detailed message be left on voicemail: yes     Reason for Call: Other: Pt called requesting to speak with Dr. Islas or her nurse regarding her upcoming surgery. Pt stated she heard some rumblings about how there is a possibility to cancel some electives and she was wondering if she should be worried about her surgery being cancelled. I tried calling the back line with no answer. Please call the Pt back to clarify, thank you!     Action Taken: Message routed to:  [facility] ENT    Travel Screening: Not Applicable

## 2020-11-10 ENCOUNTER — PRE VISIT (OUTPATIENT)
Dept: OTOLARYNGOLOGY | Facility: CLINIC | Age: 40
End: 2020-11-10

## 2020-11-13 ENCOUNTER — HOSPITAL PATHOLOGY (OUTPATIENT)
Dept: OTHER | Facility: CLINIC | Age: 40
End: 2020-11-13

## 2020-11-16 ENCOUNTER — HEALTH MAINTENANCE LETTER (OUTPATIENT)
Age: 40
End: 2020-11-16

## 2020-11-16 LAB — COPATH REPORT: NORMAL

## 2020-11-18 ENCOUNTER — TELEPHONE (OUTPATIENT)
Dept: OTOLARYNGOLOGY | Facility: CLINIC | Age: 40
End: 2020-11-18

## 2020-11-18 NOTE — TELEPHONE ENCOUNTER
[facility] Call Center    Phone Message    May a detailed message be left on voicemail: yes     Reason for Call: Other: Christina from [employer] is asking Dr Islas to complete the dictation for the 11.3.20 appt as Christina needs to send it to the insurance company. Please call Christina with any questions. Thanks.     Action Taken: Message routed to:  [facility]  ent    Travel Screening: Not Applicable

## 2020-12-02 ENCOUNTER — TELEPHONE (OUTPATIENT)
Dept: OTOLARYNGOLOGY | Facility: CLINIC | Age: 40
End: 2020-12-02

## 2020-12-04 ENCOUNTER — TELEPHONE (OUTPATIENT)
Dept: OTOLARYNGOLOGY | Facility: CLINIC | Age: 40
End: 2020-12-04

## 2020-12-04 NOTE — TELEPHONE ENCOUNTER
Called patient back regarding upcoming surgery on Wednesday, 12/9. Informed patient we are moving forward with surgery on Wednesday with Dr. [facility]las [facility]. Patient states pre-op was completed on Wednesday 12/2 by Sh[facility]al Ca[facility].   Patient also states that she tested positive for COVID-19 on 11/19.   Patient had mild symptoms but is no longer symptomatic. Patient states she PCP should be faxing over positive covid19 test. Pre-op covid19 test was cancelled as patient tested positive within 90 days of surgery.   Writer will update Dr. Islas and pre-op department at the hospital.       Henna Glass   Perioperative Coordinator   Department of Otolaryngology    Office: [phone]   12/04/20  3:01 PM

## 2020-12-07 ENCOUNTER — OFFICE VISIT (OUTPATIENT)
Dept: AUDIOLOGY | Facility: CLINIC | Age: 40
End: 2020-12-07
Payer: COMMERCIAL

## 2020-12-07 DIAGNOSIS — H83.8X2 SUPERIOR SEMICIRCULAR CANAL DEHISCENCE OF LEFT EAR: Primary | ICD-10-CM

## 2020-12-07 PROCEDURE — 92550 TYMPANOMETRY & REFLEX THRESH: CPT | Performed by: AUDIOLOGIST

## 2020-12-07 PROCEDURE — 92557 COMPREHENSIVE HEARING TEST: CPT | Performed by: AUDIOLOGIST

## 2020-12-07 NOTE — PROGRESS NOTES
AUDIOLOGY REPORT    SUMMARY: Audiology visit completed. See audiogram for results.      RECOMMENDATIONS: Follow-up with ENT.      Enriqueta Segura.  Licensed Audiologist  [place] #[id number]

## 2020-12-08 ENCOUNTER — VIRTUAL VISIT (OUTPATIENT)
Dept: SURGERY | Facility: CLINIC | Age: 40
End: 2020-12-08
Payer: COMMERCIAL

## 2020-12-08 ENCOUNTER — TELEPHONE (OUTPATIENT)
Dept: OTOLARYNGOLOGY | Facility: CLINIC | Age: 40
End: 2020-12-08

## 2020-12-08 ENCOUNTER — PRE VISIT (OUTPATIENT)
Dept: SURGERY | Facility: CLINIC | Age: 40
End: 2020-12-08

## 2020-12-08 ENCOUNTER — ANESTHESIA EVENT (OUTPATIENT)
Dept: SURGERY | Facility: CLINIC | Age: 40
End: 2020-12-08
Payer: COMMERCIAL

## 2020-12-08 DIAGNOSIS — Z01.818 PREOP EXAMINATION: Primary | ICD-10-CM

## 2020-12-08 DIAGNOSIS — H83.8X2 SUPERIOR SEMICIRCULAR CANAL DEHISCENCE OF LEFT EAR: ICD-10-CM

## 2020-12-08 PROCEDURE — 99203 OFFICE O/P NEW LOW 30 MIN: CPT | Mod: 95 | Performed by: NURSE PRACTITIONER

## 2020-12-08 RX ORDER — ACETAMINOPHEN 325 MG/1
325-650 TABLET ORAL EVERY 6 HOURS PRN
COMMUNITY

## 2020-12-08 SDOH — HEALTH STABILITY: MENTAL HEALTH: HOW OFTEN DO YOU HAVE 6 OR MORE DRINKS ON ONE OCCASION?: NOT ASKED

## 2020-12-08 SDOH — ECONOMIC STABILITY: INCOME INSECURITY: HOW HARD IS IT FOR YOU TO PAY FOR THE VERY BASICS LIKE FOOD, HOUSING, MEDICAL CARE, AND HEATING?: NOT ASKED

## 2020-12-08 SDOH — HEALTH STABILITY: MENTAL HEALTH: HOW MANY STANDARD DRINKS CONTAINING ALCOHOL DO YOU HAVE ON A TYPICAL DAY?: NOT ASKED

## 2020-12-08 SDOH — ECONOMIC STABILITY: FOOD INSECURITY: WITHIN THE PAST 12 MONTHS, THE FOOD YOU BOUGHT JUST DIDN'T LAST AND YOU DIDN'T HAVE MONEY TO GET MORE.: NOT ASKED

## 2020-12-08 SDOH — ECONOMIC STABILITY: TRANSPORTATION INSECURITY
IN THE PAST 12 MONTHS, HAS THE LACK OF TRANSPORTATION KEPT YOU FROM MEDICAL APPOINTMENTS OR FROM GETTING MEDICATIONS?: NOT ASKED

## 2020-12-08 SDOH — ECONOMIC STABILITY: TRANSPORTATION INSECURITY
IN THE PAST 12 MONTHS, HAS LACK OF TRANSPORTATION KEPT YOU FROM MEETINGS, WORK, OR FROM GETTING THINGS NEEDED FOR DAILY LIVING?: NOT ASKED

## 2020-12-08 SDOH — HEALTH STABILITY: MENTAL HEALTH: HOW OFTEN DO YOU HAVE A DRINK CONTAINING ALCOHOL?: NOT ASKED

## 2020-12-08 SDOH — ECONOMIC STABILITY: FOOD INSECURITY: WITHIN THE PAST 12 MONTHS, YOU WORRIED THAT YOUR FOOD WOULD RUN OUT BEFORE YOU GOT MONEY TO BUY MORE.: NOT ASKED

## 2020-12-08 ASSESSMENT — PAIN SCALES - GENERAL: PAINLEVEL: NO PAIN (0)

## 2020-12-08 ASSESSMENT — LIFESTYLE VARIABLES: TOBACCO_USE: 1

## 2020-12-08 NOTE — ANESTHESIA PREPROCEDURE EVALUATION
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]   Procedure(s):  Transmastoid approach for superior semicircular canal dehiscence repair     LABS:  CBC:   Lab Results   Component Value Date    WBC 13.3 (H) 2014    HGB 12.1 2017    HGB 13.0 2017    HCT 32.1 (L) 2014     2014     BMP:   Lab Results   Component Value Date     2014    POTASSIUM 3.6 2014    CHLORIDE 106 2014    CO2 27 2014    BUN 13 2014    CR 0.75 2014    GLC 78 2014     COAGS:   Lab Results   Component Value Date    INR 0.91 2014     POC: No results found for: BGM, HCG, HCGS  OTHER:   Lab Results   Component Value Date    LACT 0.4 2014    DAVID 8.3 (L) 2014    ALBUMIN 3.0 (L) 2014    PROTTOTAL 6.3 (L) 2014    ALT 20 2014    AST 18 2014    ALKPHOS 58 2014    BILITOTAL 0.1 (L) 2014    LIPASE 108 2014    CRP 9.6 (H) 2014        Preop Vitals    BP Readings from Last 3 Encounters:   20 115/70   17 114/69   17 118/69    Pulse Readings from Last 3 Encounters:   20 70   17 68   03/30/15 65      Resp Readings from Last 3 Encounters:   17 18   17 18   17 18    SpO2 Readings from Last 3 Encounters:   17 98%   03/28/15 99%   14 100%      Temp Readings from Last 1 Encounters:   17 98.1  F (36.7  C) (Oral)    Ht Readings from Last 1 Encounters:   20 1.651 m (5' 5\")      Wt Readings from Last 1 Encounters:   20 63 kg (139 lb)    Estimated body mass index is 23.13 kg/m  as calculated from the following:    Height as of 11/3/20: 1.651 m (5' 5\").    Weight as of 11/3/20: 63 kg (139 lb).     LDA:        Past Medical History:   Diagnosis Date     Abnormal Pap smear "     WNL now - leep     Anxiety      Rh incompatibility       Past Surgical History:   Procedure Laterality Date      SECTION        SECTION N/A 3/27/2015    Procedure:  SECTION;  Surgeon: Shantal Ca MD;  Location: [facility]     COMBINED  SECTION, SALPINGECTOMY BILATERAL Bilateral 2017    Procedure: COMBINED  SECTION, SALPINGECTOMY BILATERAL;  REPEAT  SECTION, SALPINGECTOMY BILATERAL ;  Surgeon: Shantal Ca MD;  Location: [facility]     HC ENLARGE BREAST WITH IMPLANT       ORTHOPEDIC SURGERY      shoulder     SHOULDER SURGERY  2013    decomprasion dce      No Known Allergies     Anesthesia Evaluation     . Pt has had prior anesthetic. Type: General and MAC    No history of anesthetic complications          ROS/MED HX    ENT/Pulmonary:     (+)other ENT- superior semicircular canal dehiscence of left ear, tobacco use, Past use 0.5 packs/day  , . .   (-) PRESTON risk factors and recent URI   Neurologic:  - neg neurologic ROS     Cardiovascular:  - neg cardiovascular ROS   (+) ----. : . . . :. . No previous cardiac testing       METS/Exercise Tolerance:  >4 METS   Hematologic:  - neg hematologic  ROS      (-) history of blood clots and History of Transfusion   Musculoskeletal:  - neg musculoskeletal ROS       GI/Hepatic:  - neg GI/hepatic ROS       Renal/Genitourinary:  - ROS Renal section negative       Endo:  - neg endo ROS       Psychiatric:     (+) psychiatric history anxiety      Infectious Disease:  - neg infectious disease ROS      (-) Recent Fever   Malignancy:      - no malignancy   Other:    (+) No chance of pregnancy no H/O Chronic Pain,                       PHYSICAL EXAM:   Mental Status/Neuro: A/A/O   Airway: Facies: Feasible  Mallampati: I  Mouth/Opening: Full  TM distance: > 6 cm  Neck ROM: Full   Respiratory: Auscultation: CTAB     Resp. Rate: Normal     Resp. Effort: Normal      CV: Rhythm: Regular  Rate: Age appropriate  Heart: Normal  Sounds  Edema: None   Comments:      Dental: Normal Dentition                Assessment:   ASA SCORE: 1    H&P: History and physical reviewed and following examination; no interval change.   Smoking Status:  Non-Smoker/Unknown   NPO Status: NPO Appropriate     Plan:   Anes. Type:  General   Pre-Medication: None   Induction:  IV (Standard)   Airway: ETT; Oral   Access/Monitoring: PIV   Maintenance: Balanced     Postop Plan:   Postop Pain: Opioids  Postop Sedation/Airway: Not planned  Disposition: Inpatient/Admit     PONV Management:   Adult Risk Factors: Female, Non-Smoker, Postop Opioids   Prevention: Ondansetron, Dexamethasone     CONSENT: Direct conversation   Plan and risks discussed with: Patient   Blood Products: Consent Deferred (Minimal Blood Loss)                PAC Discussion and Assessment    ASA Classification: 1  Case is suitable for: Adrian  Anesthetic techniques and relevant risks discussed: GA  Invasive monitoring and risk discussed:   Types:   Possibility and Risk of blood transfusion discussed:   NPO instructions given:   Additional anesthetic preparation and risks discussed:   Needs early admission to pre-op area:   Other:     PAC Resident/NP Anesthesia Assessment:  Wesley Goins is a 40 year old female scheduled for a Transmastoid approach for superior semicircular canal dehiscence repair (Left Ear) on 12/9/20 by Dr. Islas in treatment of a superior semicircular canal dehiscence of left ear.  PAC referral for risk assessment and optimization for anesthesia with comorbid conditions of: anxiety.    Pre-operative considerations:  1.  Cardiac:  Functional status- METS >4.  She has been walking for exercise recently.  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  She has no known cardiac conditions.  Intermediate risk surgery with 0.4% risk of major adverse cardiac event.   2.  Pulm:  PRESTON risk: low.  She quit smoking in 2009.   She had a positive  covid test on 11/19/20.  She has been asymptomatic since 11/22/20.  Repeat testing prior to surgery not indicated per current protocol.   3.  GI:  Risk of PONV score = 3.  If > 2, anti-emetic intervention recommended.    VTE risk: 0.26%    Virtual visit - Please refer to the physical examination documented by the anesthesiologist in the anesthesia record on the day of surgery      Patient is optimized and is acceptable candidate for the proposed procedure.  No further diagnostic evaluation is needed.           Loly Glass DNP, RN, APRN      Reviewed and Signed by PAC Mid-Level Provider/Resident  Mid-Level Provider/Resident: Loly Glass DNP, RN, APRN  Date: 12/8/20  Time: 1708    Attending Anesthesiologist Anesthesia Assessment:        Anesthesiologist:   Date:   Time:   Pass/Fail:   Disposition:     PAC Pharmacist Assessment:        Pharmacist:   Date:   Time:    DEANGELO Morales CNP

## 2020-12-08 NOTE — TELEPHONE ENCOUNTER
FUTURE VISIT INFORMATION      SURGERY INFORMATION:    Date: 12/9/20    Location: [facility] or    Surgeon:  Neli Islas MD    Anesthesia Type:  general    Procedure: Transmastoid approach for superior semicircular canal dehiscence repair    Consult: ov 12/7    RECORDS REQUESTED FROM:       Primary Care Provider: Shantal Ca MD- [facility]

## 2020-12-08 NOTE — TELEPHONE ENCOUNTER
[facility] Call Center    Phone Message    May a detailed message be left on voicemail: yes     Reason for Call: Other: Pt did not get Henna's e-mail written down correctly (or some such thing) and needs it given to her again. Henna please call back and give e-mail to Pt again. Thanks!    Action Taken: Message routed to:  [facility] ([facility] ENT    Travel Screening: Not Applicable

## 2020-12-08 NOTE — PROGRESS NOTES
"Wesley Goins is a 40 year old female who is being evaluated via a billable video visit.      The patient has been notified of following:     \"This video visit will be conducted via a call between you and your physician/provider. We have found that certain health care needs can be provided without the need for an in-person physical exam.  This service lets us provide the care you need with a video conversation.  If a prescription is necessary we can send it directly to your pharmacy.  If lab work is needed we can place an order for that and you can then stop by our lab to have the test done at a later time.    Video visits are billed at different rates depending on your insurance coverage.  Please reach out to your insurance provider with any questions.    If during the course of the call the physician/provider feels a video visit is not appropriate, you will not be charged for this service.\"    Patient has given verbal consent for Video visit? Yes  How would you like to obtain your AVS? MyChart  If you are dropped from the video visit, the video invite should be resent to: Text to cell phone: [phone]  Will anyone else be joining your video visit? No        LORI Younger LPN        "

## 2020-12-08 NOTE — TELEPHONE ENCOUNTER
Called patient regarding upcoming surgery with Dr. Islas tomorrow, 12/9. Informed patient that covid-19 positive test result was not attached with notes from Dr. Shantal Ca MD provided patient with email address to send positive test result to ensure surgery is not cancelled the morning of. Informed patient that she will need to have this in her chart prior to arrival at the hospital for surgery.     Informed patient that Dr. Islas does require a pre-op H&P to clear her for upcoming surgery. Note that is in patients chart is a post op appointment and Dr. sIlas recommending patient see PAC for clearance prior to surgery. PAC appt scheduled for today at 6:00pm. Patient works at [employer] and is planning on doing this from her phone after she is out of surgery today.     Confirmed fax number of [phone] for results to be faxed to. Patient states she tested positive on 11/19/2020.

## 2020-12-08 NOTE — OR NURSING
Wesley has PAC appt at 1800 and will not be seen by PAC RN. Wesley previously called by PAS RN. I called Wesley and verified surgery date of 12-9-20, arrival time of 0530, location [facility], report to 3rd floor surgery [facility], no visitor restriction in place, knew NPO instructions, knew to do 2 pre op showers and reports she has the Scrubcare soap, NKDA, Meds include Sertraline and Vitamin D. Will take Sertraline AM of surgery and hold Vitamin D, Wesley is an AM admission. Wesley has no questions at this time.

## 2020-12-08 NOTE — H&P
Pre-Operative H & P       Video-Visit Details    Type of service:  Video Visit    Patient verbally consented to video service today: YES      Video Start Time: 1618  Video End Time (time video stopped): 1637    Originating Location (pt. Location): Home    Distant Location (provider location):  provider's home    Mode of Communication:  Doximity      CC:  Preoperative exam to assess for increased cardiopulmonary risk while undergoing surgery and anesthesia.    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 Islas at [facility].     Wesley Goins is a 40 year old female with anxiety that has superior semicircular canal dehiscence of left ear.  Earlier this fall, she had a fall on the beach while [place] in [place]  She landed on her shoulder, but doesn't note any specific head trauma.  The next morning she woke with her ear seeming to be fully blocked.  She attempted flushing the ear and using allergy medications, but that feeling and the hearing never improved.  She has multiple other related symptoms including hearing pulsing in the ear, hearing ocean-like sounds, poor balance and autophony.  She was seen by an outside ENT and diagnosed with SCD.  She was subsequently referred to Dr. Islas for surgical consideration.  The above listed procedure has since been recommended for initial management.     History is obtained from the patient and the medical record.     Past Medical History  Past Medical History:   Diagnosis Date     Abnormal Pap smear     WNL now - leep     Anxiety      Rh incompatibility        Past Surgical History  Past Surgical History:   Procedure  Laterality Date      SECTION        SECTION N/A 2015    Procedure:  SECTION;  Surgeon: Shantal Ca MD;  Location: [facility]     COMBINED  SECTION, SALPINGECTOMY BILATERAL Bilateral 2017    Procedure: COMBINED  SECTION, SALPINGECTOMY BILATERAL;  REPEAT  SECTION, SALPINGECTOMY BILATERAL ;  Surgeon: Shantal Ca MD;  Location: [facility]     HC ENLARGE BREAST WITH IMPLANT       ORTHOPEDIC SURGERY      shoulder     RADIOFREQUENCY ABLATION, UTERINE       SHOULDER SURGERY  2013    decomprasion dce       Hx of Blood transfusions/reactions: none    Hx of abnormal bleeding or anti-platelet use: none    Menstrual history: No LMP recorded. Patient has had an ablation.:     Steroid use in the last year: none    Personal or FH with difficulty with Anesthesia:  none    Prior to Admission Medications  Current Outpatient Medications   Medication Sig Dispense Refill     acetaminophen (TYLENOL) 325 MG tablet Take 325-650 mg by mouth every 6 hours as needed for mild pain       sertraline (ZOLOFT) 100 MG tablet Take 100 mg by mouth every morning        VITAMIN D, CHOLECALCIFEROL, PO Take by mouth every morning          Allergies  No Known Allergies    Social History  Social History     Socioeconomic History     Marital status:      Spouse name: Not on file     Number of children: 3     Years of education: Not on file     Highest education level: Not on file   Occupational History     Occupation: orthopedic PA-C, certified surgical tech, certified first assist     Comment: [employer] orthopedics   Social Needs     Financial resource strain: Not on file     Food insecurity     Worry: Not on file     Inability: Not on file     Transportation needs     Medical: Not on file     Non-medical: Not on file   Tobacco Use     Smoking status: Former Smoker     Packs/day: 0.50     Years: 5.00     Pack years: 2.50     Quit date:      Years since quittin.9      Smokeless tobacco: Never Used   Substance and Sexual Activity     Alcohol use: Yes     Comment: occasional     Drug use: No     Sexual activity: Yes     Partners: Male   Lifestyle     Physical activity     Days per week: Not on file     Minutes per session: Not on file     Stress: Not on file   Relationships     Social connections     Talks on phone: Not on file     Gets together: Not on file     Attends Judaism service: Not on file     Active member of club or organization: Not on file     Attends meetings of clubs or organizations: Not on file     Relationship status: Not on file     Intimate partner violence     Fear of current or ex partner: Not on file     Emotionally abused: Not on file     Physically abused: Not on file     Forced sexual activity: Not on file   Other Topics Concern     Not on file   Social History Narrative     Not on file       Family History  Family History   Problem Relation Age of Onset     No Known Problems Mother      No Known Problems Father      Thyroid Disease Sister      No Known Problems Brother      Breast Cancer Maternal Grandmother      Stomach Cancer Maternal Grandmother      Uterine Cancer Maternal Grandmother      Diabetes Maternal Grandmother      Diabetes Maternal Grandfather      Breast Cancer Paternal Grandmother      Uterine Cancer Paternal Grandmother      Diabetes Paternal Grandfather      Alcoholism Paternal Grandfather      Anesthesia Reaction No family hx of      Deep Vein Thrombosis No family hx of                  ROS/MED HX  The complete review of systems is negative other than noted in the HPI or here.   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 .   (-) PRESTON risk factors and recent URI   Neurologic:  - neg neurologic ROS     Cardiovascular:  - neg cardiovascular ROS   (+) ----. : . . . :. . No previous cardiac testing       METS/Exercise Tolerance:  >4 METS   Hematologic:  - neg  hematologic  ROS      (-) history of blood clots and History of Transfusion   Musculoskeletal:  - neg musculoskeletal ROS       GI/Hepatic:  - neg GI/hepatic ROS       Renal/Genitourinary:  - ROS Renal section negative       Endo:  - neg endo ROS       Psychiatric:     (+) psychiatric history anxiety      Infectious Disease:  - neg infectious disease ROS      (-) Recent Fever   Malignancy:      - no malignancy   Other:    (+) No chance of pregnancy no H/O Chronic Pain,                                    0 lbs 0 oz  Data Unavailable   There is no height or weight on file to calculate BMI.       Physical Exam  Constitutional: Awake, alert, cooperative, no apparent distress, and appears stated age.  Neurologic: Awake, alert, oriented to name, place and time.   Neuropsychiatric: Calm, cooperative. Normal affect.     Labs: (personally reviewed) - no recent labs, will order for DOS (tomorrow)      Outside records reviewed from: Care Everywhere      ASSESSMENT and PLAN  Wesley Goins is a 40 year old female scheduled for a Transmastoid approach for superior semicircular canal dehiscence repair (Left Ear) on 12/9/20 by Dr. Islas in treatment of a superior semicircular canal dehiscence of left ear.  PAC referral for risk assessment and optimization for anesthesia with comorbid conditions of: anxiety.    Pre-operative considerations:  1.  Cardiac:  Functional status- METS >4.  She has been walking for exercise recently.  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  She has no known cardiac conditions.  Intermediate risk surgery with 0.4% risk of major adverse cardiac event.   2.  Pulm:  PRESTON risk: low.  She quit smoking in 2009.   She had a positive covid test on 11/19/20.  She has been asymptomatic since 11/22/20.  Repeat testing prior to surgery not indicated per current protocol.   3.  GI:  Risk of PONV score = 3.  If > 2, anti-emetic intervention recommended.    VTE  risk: 0.26%    Virtual visit - Please refer to the physical examination documented by the anesthesiologist in the anesthesia record on the day of surgery      Patient is optimized and is acceptable candidate for the proposed procedure.  No further diagnostic evaluation is needed.           Loly Glass DNP, RN, APRN  Preoperative Assessment Center  [facility]  Phone: [phone]  Fax: [phone]

## 2020-12-09 ENCOUNTER — ANESTHESIA (OUTPATIENT)
Dept: SURGERY | Facility: CLINIC | Age: 40
End: 2020-12-09
Payer: COMMERCIAL

## 2020-12-09 ENCOUNTER — HOSPITAL ENCOUNTER (OUTPATIENT)
Facility: CLINIC | Age: 40
Setting detail: OBSERVATION
Discharge: HOME | End: 2020-12-10
Attending: OTOLARYNGOLOGY | Admitting: OTOLARYNGOLOGY
Payer: COMMERCIAL

## 2020-12-09 DIAGNOSIS — H83.8X2 SUPERIOR SEMICIRCULAR CANAL DEHISCENCE OF LEFT EAR: ICD-10-CM

## 2020-12-09 LAB
CREAT SERPL-MCNC: 0.89 MG/DL (ref 0.52–1.04)
GFR SERPL CREATININE-BSD FRML MDRD: 80 ML/MIN/{1.73_M2}
GLUCOSE BLDC GLUCOMTR-MCNC: 71 MG/DL (ref 70–99)
HCG UR QL: NEGATIVE
HGB BLD-MCNC: 12.2 G/DL (ref 11.7–15.7)
POTASSIUM SERPL-SCNC: 5.1 MMOL/L (ref 3.4–5.3)

## 2020-12-09 PROCEDURE — 360N000035 HC SURGERY LEVEL 5 EA 15 ADDTL MIN - UMMC: Performed by: OTOLARYNGOLOGY

## 2020-12-09 PROCEDURE — 84132 ASSAY OF SERUM POTASSIUM: CPT | Performed by: NURSE PRACTITIONER

## 2020-12-09 PROCEDURE — 250N000011 HC RX IP 250 OP 636: Performed by: ANESTHESIOLOGY

## 2020-12-09 PROCEDURE — G0378 HOSPITAL OBSERVATION PER HR: HCPCS

## 2020-12-09 PROCEDURE — 250N000009 HC RX 250: Performed by: NURSE ANESTHETIST, CERTIFIED REGISTERED

## 2020-12-09 PROCEDURE — 272N000001 HC OR GENERAL SUPPLY STERILE: Performed by: OTOLARYNGOLOGY

## 2020-12-09 PROCEDURE — 272N000004 HC RX 272: Performed by: OTOLARYNGOLOGY

## 2020-12-09 PROCEDURE — 999N000139 HC STATISTIC PRE-PROCEDURE ASSESSMENT II: Performed by: OTOLARYNGOLOGY

## 2020-12-09 PROCEDURE — 250N000011 HC RX IP 250 OP 636: Performed by: STUDENT IN AN ORGANIZED HEALTH CARE EDUCATION/TRAINING PROGRAM

## 2020-12-09 PROCEDURE — 360N000034 HC SURGERY LEVEL 5 1ST 30 MIN - UMMC: Performed by: OTOLARYNGOLOGY

## 2020-12-09 PROCEDURE — 999N001017 HC STATISTIC GLUCOSE BY METER IP

## 2020-12-09 PROCEDURE — 258N000003 HC RX IP 258 OP 636: Performed by: OTOLARYNGOLOGY

## 2020-12-09 PROCEDURE — 250N000011 HC RX IP 250 OP 636: Performed by: OTOLARYNGOLOGY

## 2020-12-09 PROCEDURE — 85018 HEMOGLOBIN: CPT | Performed by: NURSE PRACTITIONER

## 2020-12-09 PROCEDURE — 69949 UNLISTED PX INNER EAR: CPT | Performed by: OTOLARYNGOLOGY

## 2020-12-09 PROCEDURE — 82565 ASSAY OF CREATININE: CPT | Performed by: NURSE PRACTITIONER

## 2020-12-09 PROCEDURE — 761N000004 HC RECOVERY PHASE 1 LEVEL 2 EA ADDTL HR: Performed by: OTOLARYNGOLOGY

## 2020-12-09 PROCEDURE — 36415 COLL VENOUS BLD VENIPUNCTURE: CPT | Performed by: NURSE PRACTITIONER

## 2020-12-09 PROCEDURE — 258N000003 HC RX IP 258 OP 636: Performed by: NURSE ANESTHETIST, CERTIFIED REGISTERED

## 2020-12-09 PROCEDURE — 370N000001 HC ANESTHESIA TECHNICAL FEE, 1ST 30 MIN: Performed by: OTOLARYNGOLOGY

## 2020-12-09 PROCEDURE — 250N000011 HC RX IP 250 OP 636: Performed by: NURSE ANESTHETIST, CERTIFIED REGISTERED

## 2020-12-09 PROCEDURE — 250N000009 HC RX 250: Performed by: OTOLARYNGOLOGY

## 2020-12-09 PROCEDURE — 250N000003 HC SEVOFLURANE, EA 15 MIN: Performed by: OTOLARYNGOLOGY

## 2020-12-09 PROCEDURE — 250N000013 HC RX MED GY IP 250 OP 250 PS 637: Performed by: STUDENT IN AN ORGANIZED HEALTH CARE EDUCATION/TRAINING PROGRAM

## 2020-12-09 PROCEDURE — 761N000003 HC RECOVERY PHASE 1 LEVEL 2 FIRST HR: Performed by: OTOLARYNGOLOGY

## 2020-12-09 PROCEDURE — 258N000003 HC RX IP 258 OP 636: Performed by: STUDENT IN AN ORGANIZED HEALTH CARE EDUCATION/TRAINING PROGRAM

## 2020-12-09 PROCEDURE — 370N000002 HC ANESTHESIA TECHNICAL FEE, EACH ADDTL 15 MIN: Performed by: OTOLARYNGOLOGY

## 2020-12-09 PROCEDURE — 81025 URINE PREGNANCY TEST: CPT | Performed by: ANESTHESIOLOGY

## 2020-12-09 RX ORDER — EPHEDRINE SULFATE 50 MG/ML
INJECTION, SOLUTION INTRAMUSCULAR; INTRAVENOUS; SUBCUTANEOUS PRN
Status: DISCONTINUED | OUTPATIENT
Start: 2020-12-09 | End: 2020-12-09

## 2020-12-09 RX ORDER — FENTANYL CITRATE 50 UG/ML
25-50 INJECTION, SOLUTION INTRAMUSCULAR; INTRAVENOUS
Status: DISCONTINUED | OUTPATIENT
Start: 2020-12-09 | End: 2020-12-09

## 2020-12-09 RX ORDER — ACETAMINOPHEN 325 MG/1
975 TABLET ORAL EVERY 8 HOURS
Status: DISCONTINUED | OUTPATIENT
Start: 2020-12-09 | End: 2020-12-10 | Stop reason: HOSPADM

## 2020-12-09 RX ORDER — PREDNISONE 10 MG/1
10 TABLET ORAL DAILY
Status: DISCONTINUED | OUTPATIENT
Start: 2020-12-17 | End: 2020-12-10

## 2020-12-09 RX ORDER — SODIUM CHLORIDE, SODIUM LACTATE, POTASSIUM CHLORIDE, CALCIUM CHLORIDE 600; 310; 30; 20 MG/100ML; MG/100ML; MG/100ML; MG/100ML
INJECTION, SOLUTION INTRAVENOUS CONTINUOUS
Status: DISCONTINUED | OUTPATIENT
Start: 2020-12-09 | End: 2020-12-10

## 2020-12-09 RX ORDER — EPINEPHRINE NASAL SOLUTION 1 MG/ML
SOLUTION NASAL PRN
Status: DISCONTINUED | OUTPATIENT
Start: 2020-12-09 | End: 2020-12-09 | Stop reason: HOSPADM

## 2020-12-09 RX ORDER — PROPOFOL 10 MG/ML
INJECTION, EMULSION INTRAVENOUS PRN
Status: DISCONTINUED | OUTPATIENT
Start: 2020-12-09 | End: 2020-12-09

## 2020-12-09 RX ORDER — ACETAMINOPHEN 325 MG/1
325-650 TABLET ORAL EVERY 6 HOURS PRN
Status: DISCONTINUED | OUTPATIENT
Start: 2020-12-09 | End: 2020-12-09

## 2020-12-09 RX ORDER — FENTANYL CITRATE 50 UG/ML
INJECTION, SOLUTION INTRAMUSCULAR; INTRAVENOUS PRN
Status: DISCONTINUED | OUTPATIENT
Start: 2020-12-09 | End: 2020-12-09

## 2020-12-09 RX ORDER — NALOXONE HYDROCHLORIDE 0.4 MG/ML
0.2 INJECTION, SOLUTION INTRAMUSCULAR; INTRAVENOUS; SUBCUTANEOUS
Status: DISCONTINUED | OUTPATIENT
Start: 2020-12-09 | End: 2020-12-09

## 2020-12-09 RX ORDER — DEXAMETHASONE SODIUM PHOSPHATE 10 MG/ML
10 INJECTION, SOLUTION INTRAMUSCULAR; INTRAVENOUS ONCE
Status: DISCONTINUED | OUTPATIENT
Start: 2020-12-09 | End: 2020-12-09

## 2020-12-09 RX ORDER — ONDANSETRON 4 MG/1
4-8 TABLET, ORALLY DISINTEGRATING ORAL EVERY 6 HOURS PRN
Status: DISCONTINUED | OUTPATIENT
Start: 2020-12-09 | End: 2020-12-10 | Stop reason: HOSPADM

## 2020-12-09 RX ORDER — NALOXONE HYDROCHLORIDE 0.4 MG/ML
0.4 INJECTION, SOLUTION INTRAMUSCULAR; INTRAVENOUS; SUBCUTANEOUS
Status: DISCONTINUED | OUTPATIENT
Start: 2020-12-09 | End: 2020-12-10 | Stop reason: HOSPADM

## 2020-12-09 RX ORDER — ONDANSETRON 2 MG/ML
4-8 INJECTION INTRAMUSCULAR; INTRAVENOUS EVERY 6 HOURS PRN
Status: DISCONTINUED | OUTPATIENT
Start: 2020-12-09 | End: 2020-12-10 | Stop reason: HOSPADM

## 2020-12-09 RX ORDER — NALOXONE HYDROCHLORIDE 0.4 MG/ML
0.4 INJECTION, SOLUTION INTRAMUSCULAR; INTRAVENOUS; SUBCUTANEOUS
Status: DISCONTINUED | OUTPATIENT
Start: 2020-12-09 | End: 2020-12-09

## 2020-12-09 RX ORDER — SODIUM CHLORIDE, SODIUM LACTATE, POTASSIUM CHLORIDE, CALCIUM CHLORIDE 600; 310; 30; 20 MG/100ML; MG/100ML; MG/100ML; MG/100ML
INJECTION, SOLUTION INTRAVENOUS CONTINUOUS PRN
Status: DISCONTINUED | OUTPATIENT
Start: 2020-12-09 | End: 2020-12-09

## 2020-12-09 RX ORDER — ONDANSETRON 2 MG/ML
INJECTION INTRAMUSCULAR; INTRAVENOUS PRN
Status: DISCONTINUED | OUTPATIENT
Start: 2020-12-09 | End: 2020-12-09

## 2020-12-09 RX ORDER — CEFUROXIME SODIUM 1.5 G/16ML
1.5 INJECTION, POWDER, FOR SOLUTION INTRAVENOUS
Status: COMPLETED | OUTPATIENT
Start: 2020-12-09 | End: 2020-12-09

## 2020-12-09 RX ORDER — DEXAMETHASONE SODIUM PHOSPHATE 4 MG/ML
INJECTION, SOLUTION INTRA-ARTICULAR; INTRALESIONAL; INTRAMUSCULAR; INTRAVENOUS; SOFT TISSUE PRN
Status: DISCONTINUED | OUTPATIENT
Start: 2020-12-09 | End: 2020-12-09

## 2020-12-09 RX ORDER — NALOXONE HYDROCHLORIDE 0.4 MG/ML
0.2 INJECTION, SOLUTION INTRAMUSCULAR; INTRAVENOUS; SUBCUTANEOUS
Status: DISCONTINUED | OUTPATIENT
Start: 2020-12-09 | End: 2020-12-10 | Stop reason: HOSPADM

## 2020-12-09 RX ORDER — SODIUM CHLORIDE, SODIUM LACTATE, POTASSIUM CHLORIDE, CALCIUM CHLORIDE 600; 310; 30; 20 MG/100ML; MG/100ML; MG/100ML; MG/100ML
INJECTION, SOLUTION INTRAVENOUS CONTINUOUS
Status: DISCONTINUED | OUTPATIENT
Start: 2020-12-09 | End: 2020-12-09

## 2020-12-09 RX ORDER — ONDANSETRON 4 MG/1
4 TABLET, ORALLY DISINTEGRATING ORAL EVERY 30 MIN PRN
Status: DISCONTINUED | OUTPATIENT
Start: 2020-12-09 | End: 2020-12-09

## 2020-12-09 RX ORDER — LIDOCAINE HYDROCHLORIDE 20 MG/ML
INJECTION, SOLUTION INFILTRATION; PERINEURAL PRN
Status: DISCONTINUED | OUTPATIENT
Start: 2020-12-09 | End: 2020-12-09

## 2020-12-09 RX ORDER — OXYCODONE HYDROCHLORIDE 5 MG/1
5-10 TABLET ORAL
Status: DISCONTINUED | OUTPATIENT
Start: 2020-12-09 | End: 2020-12-10

## 2020-12-09 RX ORDER — PREDNISONE 20 MG/1
40 TABLET ORAL DAILY
Status: DISCONTINUED | OUTPATIENT
Start: 2020-12-10 | End: 2020-12-10

## 2020-12-09 RX ORDER — GLYCOPYRROLATE 0.2 MG/ML
INJECTION, SOLUTION INTRAMUSCULAR; INTRAVENOUS PRN
Status: DISCONTINUED | OUTPATIENT
Start: 2020-12-09 | End: 2020-12-09

## 2020-12-09 RX ORDER — CEFUROXIME AXETIL 250 MG/1
250 TABLET ORAL 2 TIMES DAILY
Status: DISCONTINUED | OUTPATIENT
Start: 2020-12-09 | End: 2020-12-10 | Stop reason: HOSPADM

## 2020-12-09 RX ORDER — PREDNISONE 20 MG/1
20 TABLET ORAL DAILY
Status: DISCONTINUED | OUTPATIENT
Start: 2020-12-15 | End: 2020-12-10

## 2020-12-09 RX ORDER — SERTRALINE HYDROCHLORIDE 100 MG/1
100 TABLET, FILM COATED ORAL EVERY MORNING
Status: DISCONTINUED | OUTPATIENT
Start: 2020-12-10 | End: 2020-12-10 | Stop reason: HOSPADM

## 2020-12-09 RX ORDER — PROMETHAZINE HYDROCHLORIDE 25 MG/ML
12.5 INJECTION INTRAMUSCULAR; INTRAVENOUS EVERY 6 HOURS PRN
Status: DISCONTINUED | OUTPATIENT
Start: 2020-12-09 | End: 2020-12-10 | Stop reason: HOSPADM

## 2020-12-09 RX ORDER — ONDANSETRON 2 MG/ML
4 INJECTION INTRAMUSCULAR; INTRAVENOUS EVERY 6 HOURS PRN
Status: DISCONTINUED | OUTPATIENT
Start: 2020-12-09 | End: 2020-12-09

## 2020-12-09 RX ORDER — ONDANSETRON 4 MG/1
4 TABLET, ORALLY DISINTEGRATING ORAL EVERY 6 HOURS PRN
Status: DISCONTINUED | OUTPATIENT
Start: 2020-12-09 | End: 2020-12-09

## 2020-12-09 RX ORDER — ACETAMINOPHEN 325 MG/1
650 TABLET ORAL EVERY 4 HOURS PRN
Status: DISCONTINUED | OUTPATIENT
Start: 2020-12-12 | End: 2020-12-10 | Stop reason: HOSPADM

## 2020-12-09 RX ORDER — ONDANSETRON 2 MG/ML
4 INJECTION INTRAMUSCULAR; INTRAVENOUS EVERY 30 MIN PRN
Status: DISCONTINUED | OUTPATIENT
Start: 2020-12-09 | End: 2020-12-09

## 2020-12-09 RX ORDER — LIDOCAINE HYDROCHLORIDE AND EPINEPHRINE 10; 10 MG/ML; UG/ML
INJECTION, SOLUTION INFILTRATION; PERINEURAL PRN
Status: DISCONTINUED | OUTPATIENT
Start: 2020-12-09 | End: 2020-12-09 | Stop reason: HOSPADM

## 2020-12-09 RX ORDER — HYDROMORPHONE HYDROCHLORIDE 1 MG/ML
.3-.5 INJECTION, SOLUTION INTRAMUSCULAR; INTRAVENOUS; SUBCUTANEOUS EVERY 5 MIN PRN
Status: DISCONTINUED | OUTPATIENT
Start: 2020-12-09 | End: 2020-12-09

## 2020-12-09 RX ADMIN — DEXAMETHASONE SODIUM PHOSPHATE 10 MG: 4 INJECTION, SOLUTION INTRA-ARTICULAR; INTRALESIONAL; INTRAMUSCULAR; INTRAVENOUS; SOFT TISSUE at 07:58

## 2020-12-09 RX ADMIN — PHENYLEPHRINE HYDROCHLORIDE 100 MCG: 10 INJECTION INTRAVENOUS at 08:39

## 2020-12-09 RX ADMIN — FENTANYL CITRATE 25 MCG: 50 INJECTION, SOLUTION INTRAMUSCULAR; INTRAVENOUS at 13:04

## 2020-12-09 RX ADMIN — LIDOCAINE HYDROCHLORIDE 70 MG: 20 INJECTION, SOLUTION INFILTRATION; PERINEURAL at 07:41

## 2020-12-09 RX ADMIN — FENTANYL CITRATE 50 MCG: 50 INJECTION, SOLUTION INTRAMUSCULAR; INTRAVENOUS at 12:44

## 2020-12-09 RX ADMIN — OXYCODONE HYDROCHLORIDE 5 MG: 5 TABLET ORAL at 14:49

## 2020-12-09 RX ADMIN — SODIUM CHLORIDE, POTASSIUM CHLORIDE, SODIUM LACTATE AND CALCIUM CHLORIDE: 600; 310; 30; 20 INJECTION, SOLUTION INTRAVENOUS at 07:32

## 2020-12-09 RX ADMIN — PHENYLEPHRINE HYDROCHLORIDE 100 MCG: 10 INJECTION INTRAVENOUS at 11:24

## 2020-12-09 RX ADMIN — ONDANSETRON 4 MG: 2 INJECTION INTRAMUSCULAR; INTRAVENOUS at 19:27

## 2020-12-09 RX ADMIN — CEFUROXIME 1.5 G: 1.5 INJECTION, POWDER, FOR SOLUTION INTRAVENOUS at 11:56

## 2020-12-09 RX ADMIN — ACETAMINOPHEN 975 MG: 325 TABLET, FILM COATED ORAL at 22:21

## 2020-12-09 RX ADMIN — HYDROMORPHONE HYDROCHLORIDE 0.3 MG: 1 INJECTION, SOLUTION INTRAMUSCULAR; INTRAVENOUS; SUBCUTANEOUS at 13:28

## 2020-12-09 RX ADMIN — CEFUROXIME 1.5 G: 1.5 INJECTION, POWDER, FOR SOLUTION INTRAVENOUS at 08:03

## 2020-12-09 RX ADMIN — PROPOFOL 150 MG: 10 INJECTION, EMULSION INTRAVENOUS at 07:42

## 2020-12-09 RX ADMIN — Medication 5 MG: at 08:00

## 2020-12-09 RX ADMIN — CEFUROXIME 1.5 G: 1.5 INJECTION, POWDER, FOR SOLUTION INTRAVENOUS at 10:00

## 2020-12-09 RX ADMIN — FENTANYL CITRATE 50 MCG: 50 INJECTION, SOLUTION INTRAMUSCULAR; INTRAVENOUS at 07:54

## 2020-12-09 RX ADMIN — PHENYLEPHRINE HYDROCHLORIDE 100 MCG: 10 INJECTION INTRAVENOUS at 08:52

## 2020-12-09 RX ADMIN — HYDROMORPHONE HYDROCHLORIDE 0.3 MG: 1 INJECTION, SOLUTION INTRAMUSCULAR; INTRAVENOUS; SUBCUTANEOUS at 13:50

## 2020-12-09 RX ADMIN — Medication 80 MG: at 07:43

## 2020-12-09 RX ADMIN — PROPOFOL 50 MG: 10 INJECTION, EMULSION INTRAVENOUS at 07:55

## 2020-12-09 RX ADMIN — OXYCODONE HYDROCHLORIDE 5 MG: 5 TABLET ORAL at 16:18

## 2020-12-09 RX ADMIN — MIDAZOLAM 2 MG: 1 INJECTION INTRAMUSCULAR; INTRAVENOUS at 07:32

## 2020-12-09 RX ADMIN — CEFUROXIME AXETIL 250 MG: 250 TABLET ORAL at 20:11

## 2020-12-09 RX ADMIN — SODIUM CHLORIDE, POTASSIUM CHLORIDE, SODIUM LACTATE AND CALCIUM CHLORIDE: 600; 310; 30; 20 INJECTION, SOLUTION INTRAVENOUS at 17:02

## 2020-12-09 RX ADMIN — PHENYLEPHRINE HYDROCHLORIDE 100 MCG: 10 INJECTION INTRAVENOUS at 10:07

## 2020-12-09 RX ADMIN — FENTANYL CITRATE 50 MCG: 50 INJECTION, SOLUTION INTRAMUSCULAR; INTRAVENOUS at 07:41

## 2020-12-09 RX ADMIN — REMIFENTANIL HYDROCHLORIDE 0.1 MCG/KG/MIN: 1 INJECTION, POWDER, LYOPHILIZED, FOR SOLUTION INTRAVENOUS at 07:56

## 2020-12-09 RX ADMIN — FENTANYL CITRATE 50 MCG: 50 INJECTION, SOLUTION INTRAMUSCULAR; INTRAVENOUS at 13:19

## 2020-12-09 RX ADMIN — ONDANSETRON 4 MG: 2 INJECTION INTRAMUSCULAR; INTRAVENOUS at 11:59

## 2020-12-09 RX ADMIN — PHENYLEPHRINE HYDROCHLORIDE 100 MCG: 10 INJECTION INTRAVENOUS at 09:51

## 2020-12-09 RX ADMIN — ACETAMINOPHEN 975 MG: 325 TABLET, FILM COATED ORAL at 14:49

## 2020-12-09 RX ADMIN — ONDANSETRON 4 MG: 4 TABLET, ORALLY DISINTEGRATING ORAL at 16:48

## 2020-12-09 RX ADMIN — OXYCODONE HYDROCHLORIDE 10 MG: 5 TABLET ORAL at 19:16

## 2020-12-09 RX ADMIN — PHENYLEPHRINE HYDROCHLORIDE 100 MCG: 10 INJECTION INTRAVENOUS at 08:28

## 2020-12-09 RX ADMIN — OXYCODONE HYDROCHLORIDE 10 MG: 5 TABLET ORAL at 22:22

## 2020-12-09 RX ADMIN — PHENYLEPHRINE HYDROCHLORIDE 100 MCG: 10 INJECTION INTRAVENOUS at 10:33

## 2020-12-09 RX ADMIN — GLYCOPYRROLATE 0.2 MG: 0.2 INJECTION, SOLUTION INTRAMUSCULAR; INTRAVENOUS at 08:06

## 2020-12-09 RX ADMIN — SODIUM CHLORIDE, POTASSIUM CHLORIDE, SODIUM LACTATE AND CALCIUM CHLORIDE: 600; 310; 30; 20 INJECTION, SOLUTION INTRAVENOUS at 16:13

## 2020-12-09 ASSESSMENT — VISUAL ACUITY: OU: BASELINE

## 2020-12-09 ASSESSMENT — MIFFLIN-ST. JEOR: SCORE: 1299.88

## 2020-12-09 ASSESSMENT — ACTIVITIES OF DAILY LIVING (ADL): ADLS_ACUITY_SCORE: 10

## 2020-12-09 NOTE — ANESTHESIA PROCEDURE NOTES
Airway   Date/Time: 12/9/2020 7:45 AM   Patient location during procedure: OR    Staff -   CRNA: Janel Quarles APRN CRNA  Performed By: CRNA    Indications and Patient Condition  Indications for airway management: brooks-procedural  Induction type:intravenousMask difficulty assessment: 1 - vent by mask    Final Airway Details  Final airway type: endotracheal airway  Successful airway:ETT - single  Endotracheal Airway Details   ETT size (mm): 7.0  Cuffed: yes  Cuff volume (mL): 8  Successful intubation technique: direct laryngoscopy  Grade View of Cords: 1  Adjucts: stylet  Measured from: gums/teeth  Secured at (cm): 22  Secured with: pink tape  Bite block used: Soft    Post intubation assessment   Placement verified by: capnometry, equal breath sounds and chest rise   Number of attempts at approach: 1  Number of other approaches attempted: 0  Secured with:pink tape  Ease of procedure: easy  Dentition: Intact and Unchanged

## 2020-12-09 NOTE — ANESTHESIA POSTPROCEDURE EVALUATION
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     Anesthesia Type: General       Disposition: Admission   Postop Pain Control: Uneventful            Sign Out: Well controlled pain   PONV: No   Neuro/Psych: Uneventful            Sign Out: Acceptable/Baseline neuro status   Airway/Respiratory: Uneventful            Sign Out: Acceptable/Baseline resp. status   CV/Hemodynamics: Uneventful            Sign Out: Acceptable CV status   Other NRE: NONE   DID A NON-ROUTINE EVENT OCCUR? No         Last Anesthesia Record Vitals:  CRNA VITALS  2020 1208 - 2020 1308      2020             Pulse:  124    SpO2:  98 %    Resp Rate (observed):  (!) 7          Last PACU Vitals:  Vitals Value Taken Time   /83 20 1410   Temp 37.1  C (98.7  F) 20 1245   Pulse 77 20 1412   Resp 15 20 1345   SpO2 96 % 20 1412   Temp src     NIBP     Pulse     SpO2     Resp     Temp     Ht Rate     Temp 2     Vitals shown include unvalidated device data.      Electronically Signed By: Nehemiah Griffith MD, 2020, 2:13 PM

## 2020-12-09 NOTE — BRIEF OP NOTE
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]  Post-operative diagnosis Same as pre-operative diagnosis    Procedure: Procedure(s):  Transmastoid approach for superior semicircular canal dehiscence repair  Surgeon: Surgeon(s) and Role:     * Neli Islas MD - Primary     * Gaey Saravia MD - Resident - Assisting     * Wenceslao May MD - Fellow - Assisting  Anesthesia: General   Estimated blood loss: Less than 10 ml  Drains: None  Specimens: * No specimens in log *  Findings:   Superior semicircular canal was isolated, packed proximal to dehience anteriorly and posteriorly with bone dust mixed with Tseal, then further secured with bone dust and fascia graft.   Complications: None.  Implants: * No implants in log *

## 2020-12-09 NOTE — PLAN OF CARE
Arrived from:  PACU at 1436  Belongings/meds:  Has blanket ,her bag and plastic bag with clothes in it.she wanted phone and  removed on arrival.  2 RN Skin Assessment Completed by:  CARLOS Canela and CARLOS Giordano  Non-intact findings documented (yes/no/NA): NA    She was rating pain a 7 and was given tylenol and oxycodone with saltines. Prince dressing in place. VSS on RA.  Had still in case, removed at 12:30p.

## 2020-12-09 NOTE — PHARMACY-ADMISSION MEDICATION HISTORY
Admission Medication History Completed by Pharmacy    See [facility] Admission Navigator for allergy information, preferred outpatient pharmacy, prior to admission medications and immunization status.     Medication History Sources:     patient    Changes made to PTA medication list (reason):    Added: None    Deleted: None    Changed: vitamin D (added strength)    Additional Information:    Patient stated does not take vitamin D every day, only when remembers.    Per patient, does not need tylenol very often    Prior to Admission medications    Medication Sig Last Dose Taking? Auth Provider   sertraline (ZOLOFT) 100 MG tablet Take 100 mg by mouth every morning  12/9/2020 at 0400 Yes Reported, Patient   Vitamin D3 (CHOLECALCIFEROL) 125 MCG (5000 UT) tablet Take 1 tablet by mouth every morning Past Week at Unknown time Yes Unknown, Entered By History   acetaminophen (TYLENOL) 325 MG tablet Take 325-650 mg by mouth every 6 hours as needed for mild pain More than a month at Unknown time  Reported, Patient         Date completed: 12/09/20    Medication history completed by: Inez Jean, PharmD Candidate

## 2020-12-09 NOTE — ANESTHESIA CARE TRANSFER NOTE
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    Anesthesia Type:   No value filed. General    Note:  Airway :Room Air  Patient transferred to:PACU  Comments: Pt alert and transported on room air to PACU. Vital signs stable. Complained of slight headache discomfort- 50mcg Fentanyl given. Report given to PACU RN. Handoff Report: Identifed the Patient, Identified the Reponsible Provider, Reviewed the pertinent medical history, Discussed the surgical course, Reviewed Intra-OP anesthesia mangement and issues during anesthesia, Set expectations for post-procedure period and Allowed opportunity for questions and acknowledgement of understanding      Vitals: (Last set prior to Anesthesia Care Transfer)    CRNA VITALS  12/9/2020 1208 - 12/9/2020 1249      12/9/2020             Pulse:  124    SpO2:  98 %    Resp Rate (observed):  (!) 7                Electronically Signed By: DEANGELO Muniz CRNA  December 9, 2020  12:49 PM

## 2020-12-09 NOTE — OR NURSING
Dr Gaye Saravia at bedside about 1345.  She was notified that patient had popping sound postop but disappeared after medicating for pain.  Patient taking ice chips well with no complaints of nausea but did complain of some dizziness to Dr Saravia.

## 2020-12-10 ENCOUNTER — PATIENT OUTREACH (OUTPATIENT)
Dept: CARE COORDINATION | Facility: CLINIC | Age: 40
End: 2020-12-10

## 2020-12-10 ENCOUNTER — APPOINTMENT (OUTPATIENT)
Dept: PHYSICAL THERAPY | Facility: CLINIC | Age: 40
End: 2020-12-10
Attending: OTOLARYNGOLOGY
Payer: COMMERCIAL

## 2020-12-10 ENCOUNTER — TELEPHONE (OUTPATIENT)
Dept: OTOLARYNGOLOGY | Facility: CLINIC | Age: 40
End: 2020-12-10

## 2020-12-10 VITALS
SYSTOLIC BLOOD PRESSURE: 109 MMHG | HEART RATE: 55 BPM | WEIGHT: 139.77 LBS | BODY MASS INDEX: 23.29 KG/M2 | TEMPERATURE: 97.3 F | RESPIRATION RATE: 16 BRPM | OXYGEN SATURATION: 97 % | HEIGHT: 65 IN | DIASTOLIC BLOOD PRESSURE: 63 MMHG

## 2020-12-10 PROCEDURE — 250N000013 HC RX MED GY IP 250 OP 250 PS 637: Performed by: STUDENT IN AN ORGANIZED HEALTH CARE EDUCATION/TRAINING PROGRAM

## 2020-12-10 PROCEDURE — 97530 THERAPEUTIC ACTIVITIES: CPT | Mod: GP

## 2020-12-10 PROCEDURE — 97116 GAIT TRAINING THERAPY: CPT | Mod: GP

## 2020-12-10 PROCEDURE — 97162 PT EVAL MOD COMPLEX 30 MIN: CPT | Mod: GP

## 2020-12-10 PROCEDURE — 258N000003 HC RX IP 258 OP 636: Performed by: STUDENT IN AN ORGANIZED HEALTH CARE EDUCATION/TRAINING PROGRAM

## 2020-12-10 PROCEDURE — G0378 HOSPITAL OBSERVATION PER HR: HCPCS

## 2020-12-10 PROCEDURE — 250N000011 HC RX IP 250 OP 636: Performed by: STUDENT IN AN ORGANIZED HEALTH CARE EDUCATION/TRAINING PROGRAM

## 2020-12-10 RX ORDER — ONDANSETRON 4 MG/1
4-8 TABLET, ORALLY DISINTEGRATING ORAL EVERY 6 HOURS PRN
Qty: 20 TABLET | Refills: 0 | Status: SHIPPED | OUTPATIENT
Start: 2020-12-10 | End: 2021-01-05

## 2020-12-10 RX ORDER — DEXAMETHASONE SODIUM PHOSPHATE 4 MG/ML
10 INJECTION, SOLUTION INTRA-ARTICULAR; INTRALESIONAL; INTRAMUSCULAR; INTRAVENOUS; SOFT TISSUE ONCE
Status: COMPLETED | OUTPATIENT
Start: 2020-12-10 | End: 2020-12-10

## 2020-12-10 RX ORDER — PREDNISONE 20 MG/1
40 TABLET ORAL DAILY
Status: DISCONTINUED | OUTPATIENT
Start: 2020-12-11 | End: 2020-12-10 | Stop reason: HOSPADM

## 2020-12-10 RX ORDER — OXYCODONE HYDROCHLORIDE 5 MG/1
5 TABLET ORAL EVERY 4 HOURS PRN
Qty: 30 TABLET | Refills: 0 | Status: SHIPPED | OUTPATIENT
Start: 2020-12-10 | End: 2021-01-05

## 2020-12-10 RX ORDER — PREDNISONE 20 MG/1
TABLET ORAL
Qty: 12 TABLET | Refills: 0 | Status: SHIPPED | OUTPATIENT
Start: 2020-12-11 | End: 2020-12-20

## 2020-12-10 RX ORDER — OXYCODONE HYDROCHLORIDE 5 MG/1
5 TABLET ORAL
Status: DISCONTINUED | OUTPATIENT
Start: 2020-12-10 | End: 2020-12-10 | Stop reason: HOSPADM

## 2020-12-10 RX ORDER — PREDNISONE 10 MG/1
10 TABLET ORAL DAILY
Status: DISCONTINUED | OUTPATIENT
Start: 2020-12-18 | End: 2020-12-10 | Stop reason: HOSPADM

## 2020-12-10 RX ORDER — CEFUROXIME AXETIL 250 MG/1
250 TABLET ORAL 2 TIMES DAILY
Qty: 14 TABLET | Refills: 0 | Status: SHIPPED | OUTPATIENT
Start: 2020-12-10 | End: 2020-12-17

## 2020-12-10 RX ORDER — PREDNISONE 20 MG/1
20 TABLET ORAL DAILY
Status: DISCONTINUED | OUTPATIENT
Start: 2020-12-16 | End: 2020-12-10 | Stop reason: HOSPADM

## 2020-12-10 RX ADMIN — SERTRALINE HYDROCHLORIDE 100 MG: 100 TABLET ORAL at 07:40

## 2020-12-10 RX ADMIN — OXYCODONE HYDROCHLORIDE 5 MG: 5 TABLET ORAL at 07:40

## 2020-12-10 RX ADMIN — CEFUROXIME AXETIL 250 MG: 250 TABLET ORAL at 07:40

## 2020-12-10 RX ADMIN — OXYCODONE HYDROCHLORIDE 5 MG: 5 TABLET ORAL at 10:15

## 2020-12-10 RX ADMIN — SODIUM CHLORIDE, POTASSIUM CHLORIDE, SODIUM LACTATE AND CALCIUM CHLORIDE: 600; 310; 30; 20 INJECTION, SOLUTION INTRAVENOUS at 04:17

## 2020-12-10 RX ADMIN — OXYCODONE HYDROCHLORIDE 10 MG: 5 TABLET ORAL at 04:17

## 2020-12-10 RX ADMIN — DEXAMETHASONE SODIUM PHOSPHATE 10 MG: 4 INJECTION, SOLUTION INTRAMUSCULAR; INTRAVENOUS at 06:25

## 2020-12-10 RX ADMIN — OXYCODONE HYDROCHLORIDE 10 MG: 5 TABLET ORAL at 01:07

## 2020-12-10 RX ADMIN — ACETAMINOPHEN 975 MG: 325 TABLET, FILM COATED ORAL at 05:52

## 2020-12-10 NOTE — PROGRESS NOTES
"   12/10/20 0856   Quick Adds   Type of Visit Initial PT Evaluation   Living Environment   People in home child(marcos), dependent;spouse   Current Living Arrangements house   Home Accessibility stairs within home;stairs to enter home   Number of Stairs, Main Entrance 2   Stair Railings, Main Entrance none   Number of Stairs, Within Home, Primary other (see comments)  (1 flight with landing)   Stair Railings, Within Home, Primary railings on both sides of stairs   Transportation Anticipated car, drives self;family or friend will provide   Living Environment Comments Pt reports that she lives with 3 small children in a 2 story home. Pt plans to stay on second floor if needed where SO will be able to care for children and complete IADLs. SO will A pt with 2 JACKELYN home.    Self-Care   Usual Activity Tolerance good   Current Activity Tolerance moderate   Equipment Currently Used at Home none   Activity/Exercise/Self-Care Comment IND at baseline. 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    Disability/Function   Hearing Difficulty or Deaf no   Wear Glasses or Blind no   Concentrating, Remembering or Making Decisions Difficulty no   Difficulty Communicating no   Difficulty Eating/Swallowing no   Walking or Climbing Stairs Difficulty no   Dressing/Bathing Difficulty no   Toileting issues no   Doing Errands Independently Difficulty (such as shopping) yes   Fall history within last six months yes   Number of times patient has fallen within last six months 1   Change in Functional Status Since Onset of Current Illness/Injury yes   General Information   Onset of Illness/Injury or Date of Surgery 12/09/20   Referring Physician Gaye Saravia MD   Patient/Family Therapy Goals Statement (PT) Return to PLOF/work   Pertinent History of Current Problem (include personal factors and/or comorbidities that impact the POC) Per chart: \" 40 year old female who presents for " "pre-operative H & P in preparation for a Transmastoid approach for superior semicircular canal dehiscence repair (Left Ear) on 12/9/20.  Earlier this fall, she had a fall on the beach while on vacation in [facility]. \"   Existing Precautions/Restrictions fall   General Observations Activity: up ad tremaine   Cognition   Orientation Status (Cognition) oriented x 4   Cognitive Status Comments No cognitive deficits observed   Pain Assessment   Patient Currently in Pain Yes, see Vital Sign flowsheet   Range of Motion (ROM)   ROM Quick Adds ROM WFL   ROM Comment stiff L neck   Strength   Manual Muscle Testing Quick Adds Strength WNL   Bed Mobility   Comment (Bed Mobility) SBA   Transfers   Transfer Safety Comments SBA   Gait/Stairs (Locomotion)   Comment (Gait/Stairs) SBA   Clinical Impression   Criteria for Skilled Therapeutic Intervention yes, treatment indicated   PT Diagnosis (PT) Impaired functional mobility   Influenced by the following impairments dizziness, nausea, balance   Functional limitations due to impairments falls risk, increased supervision/assist, limited activity tolernace   Clinical Presentation Stable/Uncomplicated   Clinical Presentation Rationale clinical judgement and chart review   Clinical Decision Making (Complexity) moderate complexity   Therapy Frequency (PT) Daily   Predicted Duration of Therapy Intervention (days/wks) 1-2 days   Planned Therapy Interventions (PT) balance training;gait training;patient/family education   Risk & Benefits of therapy have been explained evaluation/treatment results reviewed;care plan/treatment goals reviewed;risks/benefits reviewed;patient   PT Discharge Planning    PT Discharge Recommendation (DC Rec) home with outpatient physical therapy   PT Rationale for DC Rec Safe to dc home with A from SO. Pt would benefit from continued OP vestibular rehab.    PT Brief overview of current status  Nursing: IND in room, SBA in haskins; encourage walks and head movement   Total " Evaluation Time   Total Evaluation Time (Minutes) 8

## 2020-12-10 NOTE — OP NOTE
DATE: 12/9/2020     PREOPERATIVE DIAGNOSIS:    1.  Left superior semicircular canal dehiscence syndrome     POSTOPERATIVE DIAGNOSIS:  1.  Left superior semicircular canal dehiscence syndrome     PROCEDURES:   1.  Transmastoid approach and occlusion of left superior semicircular canal dehiscence  2.  Continuous facial nerve monitoring.      ATTENDING SURGEON:  Neli Islas MD.      FELLOW:  Wenceslao May MD     ANESTHESIA:  General.      ESTIMATED BLOOD LOSS: 30 mL.      COMPLICATIONS:  None     INDICATIONS FOR PROCEDURE: Wesley Goins is a 40 year old woman with left superior semicircular canal dehiscence syndrome, verified by imaging, audiometry and VEMP testing.  After counseling about the different management strategies, and risks and benefits of the procedure, she decided to undergo transmastoid occlusion of the left superior semicircular canal.     INTRAOPERATIVE FINDINGS:  Pneumatized and aerated mastoid with low hanging tegmen and expected tegmen dehiscence, but curvature permitting identification of the labyrinth. Superior canal was well situated to permit the intended fenestration at either end with packing using autologous materials.     DESCRIPTION OF PROCEDURE:  The patient was brought to the operating room and placed supine on the operating room table.  General endotracheal anesthesia was administered.  The table was turned 180 degrees and facial nerve monitoring electrodes were placed within ipsilateral orbicularis oculi and orbicularis oris with a ground in the shoulder.  Tap test was performed and impedances were checked and verified, and continuous facial nerve monitoring was performed throughout the procedure.        A C-shaped postauricular incision was designed and instilled with 1% lidocaine with 1:100,000 epinephrine.  The ear was prepped and draped in the usual sterile fashion.  The incision was carried down through the skin and subcutaneous tissue to the level of temporalis fascia.   Mastoid periosteum was incised, and the cortex was exposed.  Using the otologic microscope, a canal wall up mastoidectomy was then performed identifying the tegmen mastoideum and the external auditory canal and the dome of the sigmoid sinus. During the removal of the cortex, bone richard was harvested and set aside to dry as a packing material for the semicircular canal.  The tegmen is low-lying medially, but with dissection access could be gained to the intended region for operative repair.  The antrum was opened and the lateral semicircular canal and incus were identified.  The air cells superior and posterior to the labyrinth were removed, exposing and skeletonizing the arc of the lateral semicircular canal and the dome of the posterior semicircular canal.  The lateral aspect of the superior semicircular canal was identified, and further excavation was performed in the retro-labyrinthine air cell tract for full definition of its posterior border as well.  The middle ear space was then copiously irrigated with saline to rinse accumulating bone dust and the entire region of the mastoid also irrigated.     Working superior to the dome of the lateral semicircular canal, the lateral aspect of the superior semicircular canal was systematically thinned using the lia bur until the anterior and posterior arcs of the canal were blue lined.  The drill speed was then brought down to its lowest possible setting.  The bone covering the endosteum of the posterior arc of the superior semicircular canal was thinned, creating an eggshell.  A round window rasp was then used to gently reflect the ledges.  An opening that is approximately 2 mm in length was created, spanning the width of the canal.  Bone richard that had been mixed with fibrin glue was placed in the fenestration and packed with a saline-soaked micro-cotton ball.  Additional bone richard was placed lateral to this and then additionally packed to ensure that the fenestra  was gently and fully occluded.  No suctioning was performed of the fluid and care was taken not to manipulate further the labyrinthine membrane.  Next, a similar procedure was performed along the anterior dome of the superior semicircular canal, taking care to stay superior to the ampullated end. The 2 mm fenestra was packed with the bone richard mixed with fibrin and additional layer placed lateral to this and additionally packed again until it was flush with the surrounding bone. Remaining bone richard was then placed along the lateral aspect of both repairs. A fascia graft that had been harvested from the temporalis muscle previously and allowed to dry was trimmed and placed lateral to the bone richard repair overlying the superior semicircular canal and held in place with Gelfoam soaked in saline to hold the graft in place.     The mastoid periosteum was closed with 3-0 Vicryl sutures.  The postauricular incision was closed in multiple layers with 3-0 Vicryl sutures. Steri-Strips were applied, and a Dixon mastoid dressing was secured.

## 2020-12-10 NOTE — PLAN OF CARE
OBSERVATION GOALS:     Pain control - MET, L head pain controlled w/ PRN Oxycodone and scheduled Tylenol.  Nausea/dizziness control - NOT MET, still experiencing dizziness w/ position changes.  Ambulating - MET  PT consult completed - NOT MET    Status: POD #1 L transmastoid approach for superior semicircular canal dehiscence repair.  VS: Afebrile. VSS on RA.   Neuros: A&Ox4.   GI/: Tolerating regular diet. Denies nausea. No BM. No flatus. Voiding spontaneously.   IV: L PIV infusing w/ LR @ 75ml/hr.   Activity: Up w/ SBA. Intermittent dizziness, improves quickly once standing.   Pain: L incisional pain controlled w/ PRN Oxycodone and scheduled Tylenol. Cold packs applied.   Respiratory/Trach: No issues.   Skin: [place] dressing to L ear.   Plan of Care: Continue to monitor and follow POC.

## 2020-12-10 NOTE — PLAN OF CARE
Pt neuros unchanged and VSS. Discharge instructions explained and went through with the patient. Patient has no additional questions at this time. Medications are being filled at the discharge pharmacy. Will continue to monitor.

## 2020-12-10 NOTE — PLAN OF CARE
OBSERVATION GOALS:    Pain control - MET, L head pain controlled w/ PRN Oxycodone and scheduled Tylenol.  Nausea/dizziness control - NOT MET, still experiencing dizziness w/ position changes.  Ambulating - MET  PT consult completed - NOT MET

## 2020-12-10 NOTE — DISCHARGE SUMMARY
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 Management of this condition was discussed, and after going through her options she wished to proceed with a transmastoid approach to superior semicircular canal dehiscence repair.    Hospital Course: The patient was admitted to the hospital and underwent the above mentioned procedure. She tolerated the procedure without any intra- or brooks-operative complications. Please see the operative report for full details of the procedure. The patient was admitted for post-operative monitoring. Her postoperative course was uneventful. She reported improvement in SSCD symptoms, incuding that she no longer had pulsatile tinnitus nor could she hear her eyes moving in the left ear. At discharge, the patient's pain was well controlled, the patient was voiding on her own, and she was ambulating and tolerating oral intake. She was seen by PT and exercises initiated. She denied significant nausea or uncontrolled vertigo. She was discharged with instructions for home care with appropriate follow-up in place.    Discharge Exam:  Vitals:    12/09/20 1651 12/09/20 2040 12/09/20 2315 12/10/20 0720   BP: 126/75 117/74 101/62 109/63   BP Location:   Right arm Right arm   Pulse: 66 56 55 55   Resp: 20 18 16 16   Temp: 97.3  F (36.3  C) 97.6  F (36.4  C) 98  F (36.7  C) 97.3  F (36.3  C)   TempSrc: Oral Oral Oral Oral   SpO2: 95% 96% 96%  97%   Weight:       Height:         General: A&O x3, in no acute distress, resting comfortably in hospital bed.  HEENT: PERRL, EOMI without spontaneous or gaze evoked nystagmus. HB I/VI intact bilaterally, no evidence of facial weakness. Prince dressing in place over left ear, minimal serosanguinous drainage on gauze fluffs. Tuning fork lateralized to operated left ear when placed on forehead and posterior skull.  Respiratory: Breathing non-labored on room air, no stridor, no accessory muscle use.     Discharge Medications:   LanceWesley wong BOBBY   Home Medication Instructions BARBARA:[id number]    Printed on:12/10/20 1947   Medication Information                      acetaminophen (TYLENOL) 325 MG tablet  Take 325-650 mg by mouth every 6 hours as needed for mild pain             cefuroxime (CEFTIN) 250 MG tablet  Take 1 tablet (250 mg) by mouth 2 times daily for 7 days             ondansetron (ZOFRAN-ODT) 4 MG ODT tab  Take 1-2 tablets (4-8 mg) by mouth every 6 hours as needed for nausea or vomiting             oxyCODONE (ROXICODONE) 5 MG tablet  Take 1 tablet (5 mg) by mouth every 4 hours as needed for moderate to severe pain             predniSONE (DELTASONE) 20 MG tablet  Take 2 tablets (40 mg) by mouth daily for 3 days, THEN 1.5 tablets (30 mg) daily for 2 days, THEN 1 tablet (20 mg) daily for 2 days, THEN 0.5 tablets (10 mg) daily for 2 days.             sertraline (ZOLOFT) 100 MG tablet  Take 100 mg by mouth every morning              Vitamin D3 (CHOLECALCIFEROL) 125 MCG (5000 UT) tablet  Take 1 tablet by mouth every morning                 Discharge Procedure Orders   Physical Therapy Referral   Standing Status: Future   Referral Priority: Routine Referral Type: Rehab Therapy Physical Therapy   Number of Visits Requested: 1     Reason for your hospital stay   Order Comments: Postop recovery     Adult [facility] Follow-up and recommended labs and tests   Order Comments: Follow up in ENT clinic with Dr. Islas.  "Please call the clinic with questions/concerns: [phone].    Otolaryngology/ENT Clinic:  [facility]  [address] [place] [address]     When to contact your care team   Order Comments: Please notify your doctor if you experience wound breakdown, sustained bleeding from the wound site, or increasing redness, swelling, and/or purulent malorodorous discharge from the wound site which may indicate infection. If you feel it is acute, or experience sudden changes in breathing, chest pain, or excessive sleepiness/somnolence please return to the emergency department or call 911. If you have questions or concerns during the day please call ENT clinic and [phone]. If at night you can call [facility] at [phone] and ask for the \"ENT resident on call\".     Wound care and dressings   Order Comments: You can take off your ear dressing on 12/11/2020 mid-morning.     Activity   Order Comments: No heavy lifting greater than 10 lbs and no strenuous exercise for 2 weeks or until follow up appointment. No driving while taking narcotic pain medications.     Order Specific Question Answer Comments   Is discharge order? Yes      Diet   Order Comments: Resume pre-procedure diet     Order Specific Question Answer Comments   Is discharge order? Yes      Dispo: To home in good condition. All of the patient's questions/concerns have been addressed at this time.    Aury Peralta MD PGY-3  Otolaryngology - Head & Neck Surgery    I, Neli Islas MD, saw and evaluated this patient prior to discharge. I discussed the patient with the resident and agree with plan of care as documented in the resident note.    Neli Islas MD  Otology & Neurotology  [facility]      "

## 2020-12-10 NOTE — PLAN OF CARE
Status: Day of surgery for Left Transmastoid approach for superior semicircular canal dehiscence repair.  Vitals: VSS on RA  Neuros: Alert and oriented x4. All extremities 5/5.   IV: IV infusing at 75ml/hr.  Resp/trach: WNL on RA  Diet: Regular diet  Bowel status: Bs+x4, last BM this am. Intermittent nausea, utilizing prn zofran.  : Voiding  Skin: [place] dressing to Left head, CDI  Pain: Utilizing prn oxycodone and scheduled tylenol for pain.   Activity: Up with assist of 1 and GB  Plan: Continue to monitor and follow POC.

## 2020-12-10 NOTE — PLAN OF CARE
Physical Therapy Discharge Summary    Reason for therapy discharge:    Discharged to home with outpatient therapy.    Progress towards therapy goal(s). See goals on Care Plan in [facility] electronic health record for goal details.  Goals partially met.  Barriers to achieving goals:   discharge from facility.    Therapy recommendation(s):    Continued therapy is recommended.  Rationale/Recommendations:  for vestibular rehab.

## 2020-12-31 NOTE — PATIENT INSTRUCTIONS
1. You were seen in the ENT Clinic today by Dr. Islas.  If you have any questions or concerns after your appointment, please call   - Option 1: ENT Clinic: [phone]   - Option 2: Sarah (Dr. Islas' Nurse): [phone]  2.   Plan to return to clinic in 4-6 weeks with hearing test    Sarah Beck LPN  [facility] - Otolaryngology

## 2021-01-05 ENCOUNTER — OFFICE VISIT (OUTPATIENT)
Dept: OTOLARYNGOLOGY | Facility: CLINIC | Age: 41
End: 2021-01-05
Payer: COMMERCIAL

## 2021-01-05 VITALS
OXYGEN SATURATION: 97 % | TEMPERATURE: 98.2 F | DIASTOLIC BLOOD PRESSURE: 73 MMHG | RESPIRATION RATE: 16 BRPM | HEART RATE: 69 BPM | WEIGHT: 142 LBS | SYSTOLIC BLOOD PRESSURE: 109 MMHG | HEIGHT: 65 IN | BODY MASS INDEX: 23.66 KG/M2

## 2021-01-05 DIAGNOSIS — H83.8X2 SUPERIOR SEMICIRCULAR CANAL DEHISCENCE OF LEFT EAR: Primary | ICD-10-CM

## 2021-01-05 PROCEDURE — 99024 POSTOP FOLLOW-UP VISIT: CPT | Mod: GC | Performed by: OTOLARYNGOLOGY

## 2021-01-05 ASSESSMENT — MIFFLIN-ST. JEOR: SCORE: 1309.99

## 2021-01-05 ASSESSMENT — PAIN SCALES - GENERAL: PAINLEVEL: NO PAIN (0)

## 2021-01-05 NOTE — PROGRESS NOTES
"1/5/2021    I had the pleasure of seeing Wesley Goins today for follow-up after transmastoid approach and occlusion of left superior semicircular canal dehiscence on 12/9/2020. She reports that her previously reported symptoms of decreased hearing, pressure, hearing her pulse, dysequilibrium and balance, word recognition, and autophony have resolved. She does have some left-sided headaches and it feels like she is listening \"through a tunnel\" from the left ear. It is difficult for her to evaluate her current hearing relative to preop, given the amount of autophony she had experienced. Regarding her right-sided SSCD, she does endorse increased awareness of autophony, including turning her neck and swallowing, but states that this is tolerable. She returned to work yesterday.    Physical exam: well-appearing woman in no acute distress.  Alert and answering questions appropriately.  HB 1/6 bilaterally.  Left postauricular incision is well-approximated and without overlying erythema. Tuning fork test with 512 Hz test demonstrates Farfan is midline and Rinne AC > BC bilaterally. See binocular microscope exam.    Otologic microscope exam:  Left ear: the external auditory canal is narrow, but appears healthy. A small amount of cerumen was removed with the ringed curette. The tympanic membrane is intact and appears healthy. There is no evidence of serous effusion.     Assessment/Plan: 40 year old woman now 4 weeks s/p transmastoid approach and occlusion of left superior semicircular canal dehiscence, doing well postoperatively. She endorses complete resolution of her symptoms but is uncertain if she is at her baseline hearing on the left and has some headaches at the donor fascia site that are improving. She would like to obtain an early audiogram, which we will arrange for some time next week. We will also arrange a routine follow-up audiogram in one month. She may return to work.    Erum Louis MD PGY2  ENT " Resident    Neli Islas MD  Otology & Neurotology  [facility]    I, Neli Islas MD, saw this patient with the resident/fellow and agree with the resident's findings and plan of care as documented in the resident's/fellow's note.

## 2021-01-05 NOTE — NURSING NOTE
"Chief Complaint   Patient presents with     RECHECK     post op      Blood pressure 109/73, pulse 69, temperature 98.2  F (36.8  C), resp. rate 16, height 1.651 m (5' 5\"), weight 64.4 kg (142 lb), SpO2 97 %, not currently breastfeeding.  .  Jarred Oliveros LPN   "

## 2021-01-05 NOTE — LETTER
"1/5/2021       RE: Wesley Goins  [address] [facility] [address]     Dear Colleague,    Thank you for referring your patient, Wesley Goins, to the [facility] EAR NOSE AND THROAT [facility]INIC [place] at [facility]. Please see a copy of my visit note below.    1/5/2021    I had the pleasure of seeing Wesley Goins today for follow-up after transmastoid approach and occlusion of left superior semicircular canal dehiscence on 12/9/2020. She reports that her previously reported symptoms of decreased hearing, pressure, hearing her pulse, dysequilibrium and balance, word recognition, and autophony have resolved. She does have some left-sided headaches and it feels like she is listening \"through a tunnel\" from the left ear. It is difficult for her to evaluate her current hearing relative to preop, given the amount of autophony she had experienced. Regarding her right-sided SSCD, she does endorse increased awareness of autophony, including turning her neck and swallowing, but states that this is tolerable. She returned to work yesterday.    Physical exam: well-appearing woman in no acute distress.  Alert and answering questions appropriately.  HB 1/6 bilaterally.  Left postauricular incision is well-approximated and without overlying erythema. Tuning fork test with 512 Hz test demonstrates Farfan is midline and Rinne AC > BC bilaterally. See binocular microscope exam.    Otologic microscope exam:  Left ear: the external auditory canal is narrow, but appears healthy. A small amount of cerumen was removed with the ringed curette. The tympanic membrane is intact and appears healthy. There is no evidence of serous effusion.     Assessment/Plan: 40 year old woman now 4 weeks s/p transmastoid approach and occlusion of left superior semicircular canal dehiscence, doing well postoperatively. She endorses complete resolution of her symptoms but is uncertain if she is at her " baseline hearing on the left and has some headaches at the donor fascia site that are improving. She would like to obtain an early audiogram, which we will arrange for some time next week. We will also arrange a routine follow-up audiogram in one month. She may return to work.    Erum Louis MD PGY2  ENT Resident    Neli Islas MD  Otology & Neurotology  [facility]    I, Neli Islas MD, saw this patient with the resident/fellow and agree with the resident's findings and plan of care as documented in the resident's/fellow's note.          Again, thank you for allowing me to participate in the care of your patient.      Sincerely,    Neli Islas MD

## 2021-01-08 ENCOUNTER — OFFICE VISIT (OUTPATIENT)
Dept: AUDIOLOGY | Facility: CLINIC | Age: 41
End: 2021-01-08
Payer: COMMERCIAL

## 2021-01-08 DIAGNOSIS — H90.42 SENSORINEURAL HEARING LOSS (SNHL) OF LEFT EAR WITH UNRESTRICTED HEARING OF RIGHT EAR: Primary | ICD-10-CM

## 2021-01-08 DIAGNOSIS — H83.8X2 SUPERIOR SEMICIRCULAR CANAL DEHISCENCE OF LEFT EAR: ICD-10-CM

## 2021-01-08 PROCEDURE — 92565 STENGER TEST PURE TONE: CPT | Performed by: AUDIOLOGIST

## 2021-01-08 PROCEDURE — 92550 TYMPANOMETRY & REFLEX THRESH: CPT | Performed by: AUDIOLOGIST

## 2021-01-08 PROCEDURE — 92557 COMPREHENSIVE HEARING TEST: CPT | Performed by: AUDIOLOGIST

## 2021-01-08 NOTE — Clinical Note
Updated hearing test completed for patient: please see updated audiogram on file. Patient had to leave following testing but was wondering if she should be prescribed anything for possible continued inflammation on left side. I let her know that she may just need time to continue healing but Dr. Islas or her nurse would reach out to her if anything else is recommended.   Thanks,  Tamara

## 2021-01-08 NOTE — PROGRESS NOTES
AUDIOLOGY REPORT    SUMMARY: Audiology visit completed. See audiogram for results.      RECOMMENDATIONS: Follow-up with ENT.    Enriqueta Corado. CCC-A  Licensed Audiologist   [place] #[id number]

## 2021-01-12 ENCOUNTER — TELEPHONE (OUTPATIENT)
Dept: OTOLARYNGOLOGY | Facility: CLINIC | Age: 41
End: 2021-01-12

## 2021-01-12 DIAGNOSIS — H83.8X2 SUPERIOR SEMICIRCULAR CANAL DEHISCENCE OF LEFT EAR: Primary | ICD-10-CM

## 2021-01-12 RX ORDER — PREDNISONE 20 MG/1
60 TABLET ORAL DAILY
Qty: 21 TABLET | Refills: 0 | Status: SHIPPED | OUTPATIENT
Start: 2021-01-12 | End: 2021-01-19

## 2021-01-12 NOTE — TELEPHONE ENCOUNTER
Dr. Islas reviewed audio, some improvement but in 6000/8000 range has mild change but it can be due to surgery. Dr. Islas is offering steroids if pt would like them. Pt agreed to 1 week of 60 mg. Repeat audio at next scheduled appt.    Sarah Beck LPN

## 2021-01-12 NOTE — TELEPHONE ENCOUNTER
[facility] Call Center    Phone Message    May a detailed message be left on voicemail: yes     Reason for Call: Other:   Pt is calling to inquire about the results of the hearing test on 01/08. Please follow-up.      Action Taken: Other:  ent    Travel Screening: Not Applicable

## 2021-02-05 NOTE — PATIENT INSTRUCTIONS
1. You were seen in the ENT Clinic today by Dr. Islas.  If you have any questions or concerns after your appointment, please call   - Option 1: ENT Clinic: [phone]   - Option 2: Sarah (Dr. Islas' Nurse): [phone]    2.   Plan to return to clinic as needed    Sarah Beck LPN  [facility] - Otolaryngology

## 2021-02-09 ENCOUNTER — OFFICE VISIT (OUTPATIENT)
Dept: AUDIOLOGY | Facility: CLINIC | Age: 41
End: 2021-02-09
Payer: COMMERCIAL

## 2021-02-09 ENCOUNTER — OFFICE VISIT (OUTPATIENT)
Dept: OTOLARYNGOLOGY | Facility: CLINIC | Age: 41
End: 2021-02-09
Payer: COMMERCIAL

## 2021-02-09 VITALS
OXYGEN SATURATION: 99 % | HEIGHT: 65 IN | WEIGHT: 141.09 LBS | DIASTOLIC BLOOD PRESSURE: 72 MMHG | BODY MASS INDEX: 23.51 KG/M2 | SYSTOLIC BLOOD PRESSURE: 102 MMHG | HEART RATE: 84 BPM | RESPIRATION RATE: 13 BRPM | TEMPERATURE: 97.8 F

## 2021-02-09 DIAGNOSIS — H83.8X2 SUPERIOR SEMICIRCULAR CANAL DEHISCENCE OF LEFT EAR: Primary | ICD-10-CM

## 2021-02-09 DIAGNOSIS — H83.8X3 SUPERIOR SEMICIRCULAR CANAL DEHISCENCE OF BOTH EARS: Primary | ICD-10-CM

## 2021-02-09 DIAGNOSIS — H83.8X3 SUPERIOR SEMICIRCULAR CANAL DEHISCENCE OF BOTH EARS: ICD-10-CM

## 2021-02-09 PROCEDURE — 92550 TYMPANOMETRY & REFLEX THRESH: CPT | Performed by: AUDIOLOGIST

## 2021-02-09 PROCEDURE — 99024 POSTOP FOLLOW-UP VISIT: CPT | Performed by: OTOLARYNGOLOGY

## 2021-02-09 PROCEDURE — 92557 COMPREHENSIVE HEARING TEST: CPT | Performed by: AUDIOLOGIST

## 2021-02-09 ASSESSMENT — MIFFLIN-ST. JEOR: SCORE: 1305.88

## 2021-02-09 ASSESSMENT — PAIN SCALES - GENERAL: PAINLEVEL: NO PAIN (0)

## 2021-02-09 NOTE — NURSING NOTE
"Chief Complaint   Patient presents with     RECHECK     follow up     Blood pressure 102/72, pulse 84, temperature 97.8  F (36.6  C), resp. rate 13, height 1.651 m (5' 5\"), weight 64 kg (141 lb 1.5 oz), SpO2 99 %, not currently breastfeeding.    Jarred Oliveros LPN    "

## 2021-02-09 NOTE — LETTER
2/9/2021       RE: Wesley Goins  [address] [facility] [address]     Dear Colleague,    Thank you for referring your patient, Wesley Goins, to the [facility] EAR NOSE AND THROAT CLINIC [place] at [facility]. Please see a copy of my visit note below.    02/09/21    I had the pleasure of seeing Wesley Goins today for postoperative follow-up.  She is a 40-year-old woman who has bilateral semicircular canal dehiscence and left ear has been more symptomatic.  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  After her first postoperative follow-up visit, we administered an additional course of oral steroids.    She returns today and is doing very well.  Her hearing has returned to being symmetric between the 2 ears and this has permitted significant increase in functional hearing ability in the operative setting.  She no longer has autophagy on the left and is not experiencing accompanying vestibular symptoms.  The repair on the left has revealed some right sided symptoms of fullness and autotomy but they are very manageable at this time.    On exam, she appears well.  Facial nerve function is normal bilaterally.  The left postauricular incision is healing well.  The tympanic membrane is intact and there is an aerated middle ear space.    The preoperative and postoperative audiograms were compared.  In October 2020, the left ear had a moderate mixed hearing loss rising to normal, with 45 dB speech reception threshold and 76% word recognition score.  The left ear thresholds have now normalized and and word recognition score it is 100%.    Impression and plan: Ms. 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  There is  been normalization of hearing and symptoms have resolved.  As we both expected, she has some awareness of symptoms in the right ear which also has superior semicircular canal dehiscence with these are very manageable and no intervention is desired or recommended at this time.  We discussed various things to observe during activities.  If she develops additional symptoms or concerns at any time, I have encouraged her to reach out and we can initiate additional evaluation.    Neli Islas MD  Otology & Neurotology  [facility]

## 2021-02-09 NOTE — PROGRESS NOTES
02/09/21    I had the pleasure of seeing Wesley Goins today for postoperative follow-up.  She is a 40-year-old woman who has bilateral semicircular canal dehiscence and left ear has been more symptomatic.  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  After her first postoperative follow-up visit, we administered an additional course of oral steroids.    She returns today and is doing very well.  Her hearing has returned to being symmetric between the 2 ears and this has permitted significant increase in functional hearing ability in the operative setting.  She no longer has autophagy on the left and is not experiencing accompanying vestibular symptoms.  The repair on the left has revealed some right sided symptoms of fullness and autotomy but they are very manageable at this time.    On exam, she appears well.  Facial nerve function is normal bilaterally.  The left postauricular incision is healing well.  The tympanic membrane is intact and there is an aerated middle ear space.    The preoperative and postoperative audiograms were compared.  In October 2020, the left ear had a moderate mixed hearing loss rising to normal, with 45 dB speech reception threshold and 76% word recognition score.  The left ear thresholds have now normalized and and word recognition score it is 100%.    Impression and plan: Ms. 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  There is been normalization of hearing and symptoms have resolved.  As we both expected, she has some awareness of symptoms in the right ear which also has superior semicircular canal dehiscence with these are very manageable and no intervention is desired or recommended at this time.  We discussed various things to observe during  activities.  If she develops additional symptoms or concerns at any time, I have encouraged her to reach out and we can initiate additional evaluation.    Neli Islas MD  Otology & Neurotology  [facility]

## 2021-04-03 ENCOUNTER — HEALTH MAINTENANCE LETTER (OUTPATIENT)
Age: 41
End: 2021-04-03

## 2021-09-18 ENCOUNTER — HEALTH MAINTENANCE LETTER (OUTPATIENT)
Age: 41
End: 2021-09-18

## 2022-04-30 ENCOUNTER — HEALTH MAINTENANCE LETTER (OUTPATIENT)
Age: 42
End: 2022-04-30

## 2022-11-20 ENCOUNTER — HEALTH MAINTENANCE LETTER (OUTPATIENT)
Age: 42
End: 2022-11-20

## 2023-06-01 ENCOUNTER — HEALTH MAINTENANCE LETTER (OUTPATIENT)
Age: 43
End: 2023-06-01

## 2025-01-13 NOTE — PROGRESS NOTES
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 Management of this condition was discussed, and after going through her options she wished to proceed with a transmastoid approach to superior semicircular canal dehiscence repair.     Hospital Course: The patient was admitted to the hospital and underwent the above mentioned procedure. She tolerated the procedure without any intra- or brooks-operative complications. Please see the operative report for full details of the procedure. The patient was admitted for post-operative monitoring. Her postoperative course was uneventful. She reported improvement in SSCD symptoms, incuding that she no longer had pulsatile tinnitus nor could she hear her eyes moving in the left ear. At discharge, the patient's pain was well controlled, the patient was voiding on her own, and she was ambulating and tolerating oral intake. She was seen by PT and exercises initiated. She denied significant nausea or uncontrolled vertigo. She was discharged with instructions for home care with appropriate follow-up in place.    ASSESSMENT      Discharge Assessment  Patient reports symptoms are: Improved  Does the patient have all of their medications?: Yes  Does patient know what their new medications are for?: Yes  Does patient have a follow-up appointment scheduled?:  Yes  Does patient have any other questions or concerns?: No    Post-op  Did the patient have surgery or a procedure: Yes  Incision: healing  Drainage: No  Bleeding: none  Fever: No  Chills: No  Redness: No  Warmth: No  Swelling: No  Incision site pain: No  Eating & Drinking: eating and drinking without complaints/concerns  PO Intake: regular diet  Bowel Function: constipation(Using Miralax and drinking lots of water)  Urinary Status: voiding without complaint/concerns    PLAN                                                      Outpatient Plan:      Follow up in ENT clinic with Dr. Islas. Please call the clinic with questions/concerns: [phone].    Future Appointments   Date Time Provider Department Center   1/5/2021 12:20 PM Neli Islas MD [facility]           Liliya Rodriguez [facility]                 [de-identified] : Body part: neck  Better/ Worse/ Same since last visit: worse-has had increased migraines Treatments since last visit: Had LUIS EDUARDO 8/2024 with Dr. Carranza's office with no relief, cervical spine MRI at , Denies going to PT, HEP Difficulty with: driving, sleeping Radicular symptoms: numbness in bilateral forearms and hands Current medications for pain: Cyclobenzaprine Assistive walking device: none   Today's Pain:  6/10

## 2025-02-12 NOTE — TELEPHONE ENCOUNTER
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                                                                      
LVM requesting call back or Moziot message with more information.    Gosia Glass, EMT    
DISPLAY PLAN FREE TEXT

## 2025-07-29 NOTE — PROGRESS NOTES
AUDIOLOGY REPORT    SUMMARY: Audiology visit completed. See audiogram for results.      RECOMMENDATIONS: Follow-up with ENT.      Enriqueta Segura.  Licensed Audiologist  [place] #[id number]       ambulatory

## (undated) DEVICE — LINEN FULL SHEET 5511

## (undated) DEVICE — ESU GROUND PAD ADULT W/CORD E7507

## (undated) DEVICE — SU VICRYL 0 CT-1 36" J346H

## (undated) DEVICE — PREP CHLORAPREP 26ML TINTED ORANGE  260815

## (undated) DEVICE — BLADE KNIFE BEAVER TYMPANOPLASTY 2.5MM W/60D DOWN 377200

## (undated) DEVICE — ESU ELEC BLADE 2.75" COATED/INSULATED E1455

## (undated) DEVICE — BUR STRK ROUND DIAMOND 4.0MM COARSE S2 TT DRIVE 5540-013-040

## (undated) DEVICE — SOL NACL 0.9% IRRIG 1000ML BOTTLE 2F7124

## (undated) DEVICE — DRAPE STERI TOWEL SM 1000

## (undated) DEVICE — SUCTION CANISTER MEDIVAC LINER 3000ML W/LID 65651-530

## (undated) DEVICE — WIPE INSTRUMENT MEROCEL 400200

## (undated) DEVICE — ADH LIQUID MASTISOL TOPICAL VIAL 2-3ML 0523-48

## (undated) DEVICE — BLADE KNIFE BEAVER MINI BEAVER6400

## (undated) DEVICE — LINEN TOWEL PACK X10 5473

## (undated) DEVICE — SUCTION SYSTEM LEVINE IRRIGATION SP-3000

## (undated) DEVICE — NDL 25GA 2"  8881200441

## (undated) DEVICE — NDL ANGIOCATH 14GA 1.25" 4048

## (undated) DEVICE — LINEN TOWEL PACK X5 5464

## (undated) DEVICE — BAG CLEAR TRASH 1.3M 39X33" P4040C

## (undated) DEVICE — BUR STRK ROUND DIAMOND 2.0MM FINE 5540-011-020

## (undated) DEVICE — NDL 27GA 1.25" 305136

## (undated) DEVICE — GLOVE GAMMEX NEOPRENE ULTRA SZ 7 LF 8514

## (undated) DEVICE — DRSG TEGADERM 4X10" 1627

## (undated) DEVICE — DRAPE MICROSCOPE LEICA 54X150" AR8033650

## (undated) DEVICE — LINEN HALF SHEET 5512

## (undated) DEVICE — DRAPE U SPLIT 74X120" 29440

## (undated) DEVICE — PREP POVIDONE IODINE SOLUTION 10% 4OZ

## (undated) DEVICE — DRAPE POUCH IRR 1016

## (undated) DEVICE — BLADE KNIFE CARTILAGE KURZ 8000140

## (undated) DEVICE — CAP BABY PINK/BLUE IC-2

## (undated) DEVICE — SPONGE SURGIFOAM 100 1974

## (undated) DEVICE — DECANTER VIAL 2006S

## (undated) DEVICE — SU MONOCRYL 0 CTX 36" Y398H

## (undated) DEVICE — TUBING SUCTION MEDI-VAC SOFT 3/16"X20' N520A

## (undated) DEVICE — SU VICRYL 3-0 RB-1 27" UND J215H

## (undated) DEVICE — SPONGE COTTON BALL 1/8" W/STRING 30-07

## (undated) DEVICE — STOCKING SLEEVE VASOPRESS COMPRESSION CALF MED 18" VP501M

## (undated) DEVICE — DRAPE POUCH INSTRUMENT 1018

## (undated) DEVICE — ESU LIGASURE OPEN SEALER/DIVIDER SM JAW 16.5MM LF1212A

## (undated) DEVICE — GLOVE PROTEXIS W/NEU-THERA 6.5  2D73TE65

## (undated) DEVICE — LINEN DRAPE 54X72" 5467

## (undated) DEVICE — SYR 10ML SLIP TIP W/O NDL 303134

## (undated) DEVICE — SU MONOCRYL 4-0 PS-2 27" UND Y426H

## (undated) DEVICE — DRSG GLASSCOCK ADULT S-1000

## (undated) DEVICE — SPONGE COTTONOID 1/4X1/4" 20-01S

## (undated) DEVICE — TRANSFER DEVICE BLOOD NDL HOLDER 364880

## (undated) DEVICE — GLOVE PROTEXIS BLUE W/NEU-THERA 7.0  2D73EB70

## (undated) DEVICE — BUR STRK ROUND DIAMOND 5.0MM COARSE S2 TT DRIVE 5540-013-050

## (undated) DEVICE — GLOVE PROTEXIS POWDER FREE SMT 7.0  2D72PT70X

## (undated) DEVICE — LINEN BABY BLANKET 5434

## (undated) DEVICE — PREP POVIDONE IODINE SCRUB 7.5% 4OZ APL82212

## (undated) DEVICE — DRAPE STOCKINETTE IMPERVIOUS 10" 21048

## (undated) DEVICE — BUR STRK ROUND CUTTER 1.5MM 5H EXT 5540-011-315

## (undated) DEVICE — PREP SKIN SCRUB TRAY 4461A

## (undated) DEVICE — SUCTION CANISTER STRAW 65652-008

## (undated) DEVICE — SYR 01ML 27GA 0.5" NDL TBC 309623

## (undated) DEVICE — SOL NACL 0.9% IRRIG 3000ML BAG 2B7477

## (undated) DEVICE — BUR STRK ROUND 6.0MM MULTI-FLUTE 4H S2 PIDRIVE 5540-010-060

## (undated) DEVICE — PACK C-SECTION LF PL15OTA83B

## (undated) DEVICE — BUR STRK ROUND DIAMOND 1.0X90MM FINE S2 DRIVE 5540-011-310

## (undated) DEVICE — SYR 05ML LL W/O NDL

## (undated) DEVICE — CATH TRAY FOLEY SURESTEP 16FR DRAIN BAG STATOCK A899916

## (undated) DEVICE — BLADE CLIPPER SGL USE 9680

## (undated) DEVICE — BUR STRK ROUND DIAMOND 3.0MM COARSE EXT 5540-013-330

## (undated) DEVICE — NIM PROBE PRASS INCREMENTING TIP 8225825

## (undated) DEVICE — SOL WATER IRRIG 1000ML BOTTLE 2F7114

## (undated) DEVICE — NIM ELEC SUBDERMAL NDL PAIRED 2 CHANNEL 8227410

## (undated) DEVICE — PAD CHUX UNDERPAD 30X36" P3036C

## (undated) DEVICE — LINEN TOWEL PACK X6 WHITE 5487

## (undated) DEVICE — Device

## (undated) RX ORDER — CIPROFLOXACIN AND DEXAMETHASONE 3; 1 MG/ML; MG/ML
SUSPENSION/ DROPS AURICULAR (OTIC)
Status: DISPENSED
Start: 2020-12-09

## (undated) RX ORDER — PHENYLEPHRINE HCL IN 0.9% NACL 1 MG/10 ML
SYRINGE (ML) INTRAVENOUS
Status: DISPENSED
Start: 2017-04-19

## (undated) RX ORDER — OXYTOCIN/0.9 % SODIUM CHLORIDE 30/500 ML
PLASTIC BAG, INJECTION (ML) INTRAVENOUS
Status: DISPENSED
Start: 2017-04-19

## (undated) RX ORDER — ONDANSETRON 2 MG/ML
INJECTION INTRAMUSCULAR; INTRAVENOUS
Status: DISPENSED
Start: 2017-04-19

## (undated) RX ORDER — HYDROMORPHONE HCL IN WATER/PF 6 MG/30 ML
PATIENT CONTROLLED ANALGESIA SYRINGE INTRAVENOUS
Status: DISPENSED
Start: 2020-12-09

## (undated) RX ORDER — HYDROMORPHONE HYDROCHLORIDE 1 MG/ML
INJECTION, SOLUTION INTRAMUSCULAR; INTRAVENOUS; SUBCUTANEOUS
Status: DISPENSED
Start: 2020-12-09

## (undated) RX ORDER — CEFUROXIME SODIUM 1.5 G/16ML
INJECTION, POWDER, FOR SOLUTION INTRAVENOUS
Status: DISPENSED
Start: 2020-12-09

## (undated) RX ORDER — OFLOXACIN 3 MG/ML
SOLUTION AURICULAR (OTIC)
Status: DISPENSED
Start: 2020-12-09

## (undated) RX ORDER — EPHEDRINE SULFATE 50 MG/ML
INJECTION, SOLUTION INTRAMUSCULAR; INTRAVENOUS; SUBCUTANEOUS
Status: DISPENSED
Start: 2017-04-19

## (undated) RX ORDER — MORPHINE SULFATE 1 MG/ML
INJECTION, SOLUTION EPIDURAL; INTRATHECAL; INTRAVENOUS
Status: DISPENSED
Start: 2017-04-19

## (undated) RX ORDER — EPINEPHRINE NASAL SOLUTION 1 MG/ML
SOLUTION NASAL
Status: DISPENSED
Start: 2020-12-09

## (undated) RX ORDER — FENTANYL CITRATE 50 UG/ML
INJECTION, SOLUTION INTRAMUSCULAR; INTRAVENOUS
Status: DISPENSED
Start: 2020-12-09

## (undated) RX ORDER — LIDOCAINE HYDROCHLORIDE AND EPINEPHRINE 10; 10 MG/ML; UG/ML
INJECTION, SOLUTION INFILTRATION; PERINEURAL
Status: DISPENSED
Start: 2020-12-09